# Patient Record
Sex: MALE | Race: OTHER | Employment: FULL TIME | URBAN - METROPOLITAN AREA
[De-identification: names, ages, dates, MRNs, and addresses within clinical notes are randomized per-mention and may not be internally consistent; named-entity substitution may affect disease eponyms.]

---

## 2017-07-01 ENCOUNTER — HOSPITAL ENCOUNTER (OUTPATIENT)
Age: 32
Discharge: HOME | End: 2017-07-01
Payer: COMMERCIAL

## 2017-07-01 VITALS
RESPIRATION RATE: 16 BRPM | BODY MASS INDEX: 31.07 KG/M2 | SYSTOLIC BLOOD PRESSURE: 135 MMHG | DIASTOLIC BLOOD PRESSURE: 83 MMHG | HEART RATE: 81 BPM | WEIGHT: 205 LBS | OXYGEN SATURATION: 98 % | TEMPERATURE: 99 F | HEIGHT: 68 IN
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
## 2017-07-01 PROCEDURE — 99202 OFFICE O/P NEW SF 15 MIN: CPT

## 2017-07-01 PROCEDURE — 99203 OFFICE O/P NEW LOW 30 MIN: CPT

## 2017-07-01 RX ORDER — ALBUTEROL SULFATE 90 UG/1
AEROSOL, METERED RESPIRATORY (INHALATION) EVERY 6 HOURS PRN
COMMUNITY
End: 2018-01-03
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
## 2018-01-03 ENCOUNTER — OFFICE VISIT (OUTPATIENT)
Dept: INTERNAL MEDICINE CLINIC | Facility: CLINIC | Age: 33
End: 2018-01-03

## 2018-01-03 VITALS
WEIGHT: 214 LBS | HEART RATE: 78 BPM | HEIGHT: 68 IN | TEMPERATURE: 99 F | BODY MASS INDEX: 32.43 KG/M2 | SYSTOLIC BLOOD PRESSURE: 120 MMHG | DIASTOLIC BLOOD PRESSURE: 84 MMHG | OXYGEN SATURATION: 97 %

## 2018-01-03 DIAGNOSIS — Z00.00 ANNUAL PHYSICAL EXAM: Primary | ICD-10-CM

## 2018-01-03 DIAGNOSIS — K51.919 ULCERATIVE COLITIS WITH COMPLICATION, UNSPECIFIED LOCATION (HCC): ICD-10-CM

## 2018-01-03 DIAGNOSIS — J45.40 MODERATE PERSISTENT ASTHMA WITHOUT COMPLICATION: ICD-10-CM

## 2018-01-03 PROCEDURE — 99385 PREV VISIT NEW AGE 18-39: CPT | Performed by: INTERNAL MEDICINE

## 2018-01-03 RX ORDER — ALBUTEROL SULFATE 90 UG/1
1 AEROSOL, METERED RESPIRATORY (INHALATION) EVERY 6 HOURS PRN
Qty: 1 INHALER | Refills: 5 | Status: SHIPPED | OUTPATIENT
Start: 2018-01-03 | End: 2019-02-02

## 2018-01-03 RX ORDER — FLUTICASONE PROPIONATE AND SALMETEROL 100; 50 UG/1; UG/1
POWDER RESPIRATORY (INHALATION)
COMMUNITY
Start: 2016-06-15 | End: 2018-01-03

## 2018-01-03 RX ORDER — FLUTICASONE PROPIONATE AND SALMETEROL 100; 50 UG/1; UG/1
1 POWDER RESPIRATORY (INHALATION) 2 TIMES DAILY
Qty: 3 EACH | Refills: 4 | Status: SHIPPED | OUTPATIENT
Start: 2018-01-03 | End: 2020-03-16 | Stop reason: ALTCHOICE

## 2018-01-03 RX ORDER — ALBUTEROL SULFATE 90 UG/1
2 AEROSOL, METERED RESPIRATORY (INHALATION)
COMMUNITY
Start: 2017-10-23 | End: 2018-01-03

## 2018-01-03 RX ORDER — FLUTICASONE PROPIONATE AND SALMETEROL 100; 50 UG/1; UG/1
1 POWDER RESPIRATORY (INHALATION) 2 TIMES DAILY
COMMUNITY
End: 2018-01-03

## 2018-01-03 RX ORDER — MESALAMINE 1.2 G/1
TABLET, DELAYED RELEASE ORAL
COMMUNITY
Start: 2014-12-19 | End: 2020-03-16 | Stop reason: ALTCHOICE

## 2018-01-03 NOTE — PROGRESS NOTES
Anny Rogel is a 28year old male.     Chief complaint:  Annual physical exam   HPI:     28year old male with PMH as listed below here for   Annual physical exam   Patient reports feeling well   Hx of  UC diagnosed 2009   Last colonoscopy 2016  No abdomi murmur  GI: good BS's,no masses, HSM or tenderness  EXTREMITIES: no cyanosis, clubbing or edema  NEURO: no gross deficits                Orders Placed This Encounter      DISCONTD: fluticasone-salmeterol 100-50 MCG/DOSE Inhalation Aerosol Powder, Breath Ac

## 2018-01-10 ENCOUNTER — NURSE ONLY (OUTPATIENT)
Dept: INTERNAL MEDICINE CLINIC | Facility: CLINIC | Age: 33
End: 2018-01-10

## 2018-01-10 DIAGNOSIS — K51.919 ULCERATIVE COLITIS WITH COMPLICATION, UNSPECIFIED LOCATION (HCC): ICD-10-CM

## 2018-01-10 LAB
ALBUMIN SERPL BCP-MCNC: 4.2 G/DL (ref 3.5–4.8)
ALBUMIN/GLOB SERPL: 1.2 {RATIO} (ref 1–2)
ALP SERPL-CCNC: 69 U/L (ref 32–100)
ALT SERPL-CCNC: 25 U/L (ref 17–63)
ANION GAP SERPL CALC-SCNC: 7 MMOL/L (ref 0–18)
AST SERPL-CCNC: 25 U/L (ref 15–41)
BASOPHILS # BLD: 0 K/UL (ref 0–0.2)
BASOPHILS NFR BLD: 0 %
BILIRUB SERPL-MCNC: 0.8 MG/DL (ref 0.3–1.2)
BUN SERPL-MCNC: 10 MG/DL (ref 8–20)
BUN/CREAT SERPL: 10.2 (ref 10–20)
CALCIUM SERPL-MCNC: 9.3 MG/DL (ref 8.5–10.5)
CHLORIDE SERPL-SCNC: 105 MMOL/L (ref 95–110)
CHOLEST SERPL-MCNC: 171 MG/DL (ref 110–200)
CO2 SERPL-SCNC: 27 MMOL/L (ref 22–32)
CREAT SERPL-MCNC: 0.98 MG/DL (ref 0.5–1.5)
EOSINOPHIL # BLD: 0.3 K/UL (ref 0–0.7)
EOSINOPHIL NFR BLD: 4 %
ERYTHROCYTE [DISTWIDTH] IN BLOOD BY AUTOMATED COUNT: 13.8 % (ref 11–15)
FERRITIN SERPL IA-MCNC: 27 NG/ML (ref 24–336)
FOLATE SERPL-MCNC: 14.8 NG/ML
GLOBULIN PLAS-MCNC: 3.5 G/DL (ref 2.5–3.7)
GLUCOSE SERPL-MCNC: 88 MG/DL (ref 70–99)
HCT VFR BLD AUTO: 48.6 % (ref 41–52)
HDLC SERPL-MCNC: 52 MG/DL
HGB BLD-MCNC: 16.7 G/DL (ref 13.5–17.5)
LDLC SERPL CALC-MCNC: 107 MG/DL (ref 0–99)
LYMPHOCYTES # BLD: 1.8 K/UL (ref 1–4)
LYMPHOCYTES NFR BLD: 24 %
MCH RBC QN AUTO: 29.2 PG (ref 27–32)
MCHC RBC AUTO-ENTMCNC: 34.3 G/DL (ref 32–37)
MCV RBC AUTO: 85 FL (ref 80–100)
MONOCYTES # BLD: 0.5 K/UL (ref 0–1)
MONOCYTES NFR BLD: 7 %
NEUTROPHILS # BLD AUTO: 5 K/UL (ref 1.8–7.7)
NEUTROPHILS NFR BLD: 66 %
NONHDLC SERPL-MCNC: 119 MG/DL
OSMOLALITY UR CALC.SUM OF ELEC: 286 MOSM/KG (ref 275–295)
PLATELET # BLD AUTO: 260 K/UL (ref 140–400)
PMV BLD AUTO: 9.1 FL (ref 7.4–10.3)
POTASSIUM SERPL-SCNC: 4.1 MMOL/L (ref 3.3–5.1)
PROT SERPL-MCNC: 7.7 G/DL (ref 5.9–8.4)
RBC # BLD AUTO: 5.72 M/UL (ref 4.5–5.9)
SODIUM SERPL-SCNC: 139 MMOL/L (ref 136–144)
TRIGL SERPL-MCNC: 62 MG/DL (ref 1–149)
VIT B12 SERPL-MCNC: 335 PG/ML (ref 181–914)
WBC # BLD AUTO: 7.7 K/UL (ref 4–11)

## 2018-01-10 PROCEDURE — 82607 VITAMIN B-12: CPT | Performed by: INTERNAL MEDICINE

## 2018-01-10 PROCEDURE — 80061 LIPID PANEL: CPT | Performed by: INTERNAL MEDICINE

## 2018-01-10 PROCEDURE — 80053 COMPREHEN METABOLIC PANEL: CPT | Performed by: INTERNAL MEDICINE

## 2018-01-10 PROCEDURE — 82306 VITAMIN D 25 HYDROXY: CPT | Performed by: INTERNAL MEDICINE

## 2018-01-10 PROCEDURE — 85025 COMPLETE CBC W/AUTO DIFF WBC: CPT | Performed by: INTERNAL MEDICINE

## 2018-01-10 PROCEDURE — 36415 COLL VENOUS BLD VENIPUNCTURE: CPT | Performed by: INTERNAL MEDICINE

## 2018-01-10 PROCEDURE — 82746 ASSAY OF FOLIC ACID SERUM: CPT | Performed by: INTERNAL MEDICINE

## 2018-01-10 PROCEDURE — 82728 ASSAY OF FERRITIN: CPT | Performed by: INTERNAL MEDICINE

## 2018-01-12 LAB — 25(OH)D3 SERPL-MCNC: 10.5 NG/ML

## 2018-01-14 ENCOUNTER — TELEPHONE (OUTPATIENT)
Dept: INTERNAL MEDICINE CLINIC | Facility: CLINIC | Age: 33
End: 2018-01-14

## 2018-01-14 RX ORDER — ERGOCALCIFEROL 1.25 MG/1
50000 CAPSULE ORAL WEEKLY
Qty: 12 CAPSULE | Refills: 1 | Status: SHIPPED | OUTPATIENT
Start: 2018-01-14 | End: 2018-07-18

## 2018-01-17 ENCOUNTER — TELEPHONE (OUTPATIENT)
Dept: INTERNAL MEDICINE CLINIC | Facility: CLINIC | Age: 33
End: 2018-01-17

## 2018-01-17 NOTE — TELEPHONE ENCOUNTER
Faxed # [phone] a medical records form to [id number] for records on pt named above

## 2018-07-18 RX ORDER — ERGOCALCIFEROL 1.25 MG/1
CAPSULE ORAL
Qty: 4 CAPSULE | Refills: 0 | Status: SHIPPED | OUTPATIENT
Start: 2018-07-18 | End: 2020-03-16 | Stop reason: ALTCHOICE

## 2019-02-02 DIAGNOSIS — K51.919 ULCERATIVE COLITIS WITH COMPLICATION, UNSPECIFIED LOCATION (HCC): ICD-10-CM

## 2019-02-04 NOTE — TELEPHONE ENCOUNTER
Requested Prescriptions     Pending Prescriptions Disp Refills   • PROAIR  (90 Base) MCG/ACT Inhalation Aero Soln [Pharmacy Med Name: [facility] Inhalation Aerosol Solution 108 (90 Base) MCG/ACT] 8.5 g 4     Sig: inhale 1 puff into the lungs every 6

## 2019-06-11 ENCOUNTER — LAB ENCOUNTER (OUTPATIENT)
Dept: LAB | Age: 34
End: 2019-06-11
Attending: OBSTETRICS & GYNECOLOGY
Payer: COMMERCIAL

## 2019-06-11 DIAGNOSIS — Z01.83 ENCOUNTER FOR BLOOD TYPING: Primary | ICD-10-CM

## 2019-06-11 PROCEDURE — 86900 BLOOD TYPING SEROLOGIC ABO: CPT

## 2019-06-11 PROCEDURE — 36415 COLL VENOUS BLD VENIPUNCTURE: CPT

## 2019-06-11 PROCEDURE — 86850 RBC ANTIBODY SCREEN: CPT

## 2019-06-11 PROCEDURE — 86901 BLOOD TYPING SEROLOGIC RH(D): CPT

## 2020-02-10 DIAGNOSIS — K51.919 ULCERATIVE COLITIS WITH COMPLICATION, UNSPECIFIED LOCATION (HCC): ICD-10-CM

## 2020-02-10 RX ORDER — ALBUTEROL SULFATE 90 UG/1
AEROSOL, METERED RESPIRATORY (INHALATION)
Qty: 8.5 G | Refills: 0 | OUTPATIENT
Start: 2020-02-10

## 2020-02-17 DIAGNOSIS — K51.919 ULCERATIVE COLITIS WITH COMPLICATION, UNSPECIFIED LOCATION (HCC): ICD-10-CM

## 2020-02-17 RX ORDER — ALBUTEROL SULFATE 90 UG/1
AEROSOL, METERED RESPIRATORY (INHALATION)
Qty: 8.5 G | Refills: 0 | OUTPATIENT
Start: 2020-02-17

## 2020-02-24 DIAGNOSIS — K51.919 ULCERATIVE COLITIS WITH COMPLICATION, UNSPECIFIED LOCATION (HCC): ICD-10-CM

## 2020-02-24 RX ORDER — ALBUTEROL SULFATE 90 UG/1
AEROSOL, METERED RESPIRATORY (INHALATION)
Qty: 8.5 G | Refills: 4 | OUTPATIENT
Start: 2020-02-24

## 2020-02-27 DIAGNOSIS — K51.919 ULCERATIVE COLITIS WITH COMPLICATION, UNSPECIFIED LOCATION (HCC): ICD-10-CM

## 2020-02-28 RX ORDER — ALBUTEROL SULFATE 90 UG/1
2 AEROSOL, METERED RESPIRATORY (INHALATION) EVERY 6 HOURS PRN
Qty: 8.5 G | Refills: 4 | Status: SHIPPED | OUTPATIENT
Start: 2020-02-28 | End: 2020-05-04

## 2020-03-16 ENCOUNTER — OFFICE VISIT (OUTPATIENT)
Dept: INTERNAL MEDICINE CLINIC | Facility: CLINIC | Age: 35
End: 2020-03-16
Payer: COMMERCIAL

## 2020-03-16 VITALS
RESPIRATION RATE: 21 BRPM | BODY MASS INDEX: 33.65 KG/M2 | WEIGHT: 222 LBS | OXYGEN SATURATION: 99 % | DIASTOLIC BLOOD PRESSURE: 80 MMHG | HEIGHT: 68 IN | SYSTOLIC BLOOD PRESSURE: 126 MMHG | TEMPERATURE: 98 F | HEART RATE: 92 BPM

## 2020-03-16 DIAGNOSIS — K51.919 ULCERATIVE COLITIS WITH COMPLICATION, UNSPECIFIED LOCATION (HCC): ICD-10-CM

## 2020-03-16 DIAGNOSIS — J45.20 MILD INTERMITTENT ASTHMA, UNSPECIFIED WHETHER COMPLICATED: ICD-10-CM

## 2020-03-16 DIAGNOSIS — Z00.00 ANNUAL PHYSICAL EXAM: Primary | ICD-10-CM

## 2020-03-16 PROCEDURE — 99395 PREV VISIT EST AGE 18-39: CPT | Performed by: INTERNAL MEDICINE

## 2020-03-16 RX ORDER — FLUTICASONE PROPIONATE AND SALMETEROL 100; 50 UG/1; UG/1
1 POWDER RESPIRATORY (INHALATION) 2 TIMES DAILY
Qty: 14 EACH | Refills: 1 | Status: SHIPPED | OUTPATIENT
Start: 2020-03-16

## 2020-03-16 NOTE — PROGRESS NOTES
Eloina Brooks is a 29year old male.     Chief complaint: Annual physical exam     HPI:   29year old male with PMH as listed below here for annual physical exam   Asthma   Albuterol inhaler 2-3 times per week   Not using advair  No night symptoms   No ches clear  NECK: supple,no adenopathy  LUNGS: clear to auscultation  CARDIO: RRR without murmur  GI: good BS's,no masses, HSM or tenderness  EXTREMITIES: no cyanosis, clubbing or edema  NEURO: no gross deficits              No orders of the defined types were

## 2020-05-04 DIAGNOSIS — K51.919 ULCERATIVE COLITIS WITH COMPLICATION, UNSPECIFIED LOCATION (HCC): ICD-10-CM

## 2020-05-04 RX ORDER — ALBUTEROL SULFATE 90 UG/1
2 AEROSOL, METERED RESPIRATORY (INHALATION) EVERY 6 HOURS PRN
Qty: 8.5 G | Refills: 4 | Status: SHIPPED | OUTPATIENT
Start: 2020-05-04 | End: 2021-03-08

## 2020-05-04 NOTE — TELEPHONE ENCOUNTER
Asthma & COPD Medication Protocol Failed5/4 2:16 PM   Asthma Action Score greater than or equal to 20    AAP/ACT given in last 12 months    Appointment in past 6 or next 3 months      Last OV 3/16/20   No Future appt     Patient requesting estuardo pharmac

## 2021-03-08 DIAGNOSIS — K51.919 ULCERATIVE COLITIS WITH COMPLICATION, UNSPECIFIED LOCATION (HCC): ICD-10-CM

## 2021-03-08 RX ORDER — ALBUTEROL SULFATE 90 UG/1
AEROSOL, METERED RESPIRATORY (INHALATION)
Qty: 17 G | Refills: 0 | Status: SHIPPED | OUTPATIENT
Start: 2021-03-08 | End: 2021-10-12

## 2021-03-08 RX ORDER — ALBUTEROL SULFATE 90 UG/1
AEROSOL, METERED RESPIRATORY (INHALATION)
Qty: 17 G | Refills: 0 | OUTPATIENT
Start: 2021-03-08

## 2021-04-12 ENCOUNTER — OFFICE VISIT (OUTPATIENT)
Dept: INTERNAL MEDICINE CLINIC | Facility: CLINIC | Age: 36
End: 2021-04-12
Payer: COMMERCIAL

## 2021-04-12 VITALS
SYSTOLIC BLOOD PRESSURE: 120 MMHG | DIASTOLIC BLOOD PRESSURE: 80 MMHG | OXYGEN SATURATION: 98 % | WEIGHT: 230 LBS | BODY MASS INDEX: 34.86 KG/M2 | HEIGHT: 68 IN | HEART RATE: 87 BPM

## 2021-04-12 DIAGNOSIS — K51.919 ULCERATIVE COLITIS WITH COMPLICATION, UNSPECIFIED LOCATION (HCC): ICD-10-CM

## 2021-04-12 DIAGNOSIS — F41.9 ANXIETY AND DEPRESSION: ICD-10-CM

## 2021-04-12 DIAGNOSIS — J45.20 MILD INTERMITTENT ASTHMA, UNSPECIFIED WHETHER COMPLICATED: ICD-10-CM

## 2021-04-12 DIAGNOSIS — M25.569 KNEE PAIN, UNSPECIFIED CHRONICITY, UNSPECIFIED LATERALITY: ICD-10-CM

## 2021-04-12 DIAGNOSIS — F32.A ANXIETY AND DEPRESSION: ICD-10-CM

## 2021-04-12 DIAGNOSIS — Z00.00 ANNUAL PHYSICAL EXAM: Primary | ICD-10-CM

## 2021-04-12 DIAGNOSIS — K52.9 IBD (INFLAMMATORY BOWEL DISEASE): ICD-10-CM

## 2021-04-12 PROCEDURE — 99395 PREV VISIT EST AGE 18-39: CPT | Performed by: INTERNAL MEDICINE

## 2021-04-12 PROCEDURE — 3008F BODY MASS INDEX DOCD: CPT | Performed by: INTERNAL MEDICINE

## 2021-04-12 PROCEDURE — 3074F SYST BP LT 130 MM HG: CPT | Performed by: INTERNAL MEDICINE

## 2021-04-12 PROCEDURE — 3079F DIAST BP 80-89 MM HG: CPT | Performed by: INTERNAL MEDICINE

## 2021-04-12 NOTE — PROGRESS NOTES
Nj Balderas is a 28year old male.     Chief complaint:annual physical exam       HPI:     Nj Balderas is a 28year old male who presents for annual physical exam       No chest pain   No sob   No palpitations   No urinary problems         Hx of IBD   U repeat in 3 years    • ELBOW SURGERY          Social History:  Social History    Tobacco Use      Smoking status: Never Smoker      Smokeless tobacco: Never Used    Alcohol use: Yes    Drug use: Not on file       Family History   Problem Relation Age of On Future    2. Annual physical exam  Diet and  exercise  Fasting blood work    - CBC WITH DIFFERENTIAL WITH PLATELET; Future  - COMP METABOLIC PANEL (14); Future  - LIPID PANEL;  Future  - VITAMIN D, 25-HYDROXY; Future  - TSH W REFLEX TO FREE T4; Future  - SE Future  - SED RATE, SMITHA (AUTOMATED); Future  - C-REACTIVE PROTEIN; Future  - GASTRO - INTERNAL  - Sertraline HCl 50 MG Oral Tab; Take 1 tablet (50 mg total) by mouth daily. Dispense: 30 tablet;  Refill: 1  - BH NAVIGATOR  - XR KNEE COMPLETE BILAT EM

## 2021-04-12 NOTE — PATIENT INSTRUCTIONS
Controlling High Blood Pressure  High blood pressure (hypertension) is often called the silent killer. This is because many people who have it, don’t know it. It can be very dangerous.  High blood pressure can raise your risk of heart attack, stroke, hear your meal be prepared with no added salt. Stay at a healthy weight  · Ask your healthcare provider how many calories to eat a day. Then stick to that number. · Ask your healthcare provider what weight range is healthiest for you.  If you are overweight, information is not intended as a substitute for professional medical care. Always follow your healthcare professional's instructions. Text SUPPORT1 uu 93969 to learn if you may be eligible for financial support with your medication(s).     Msg & Data contact with reality  · loss of balance or coordination  · loss of memory  · painful or prolonged erections  · restlessness, pacing, inability to keep still  · seizures  · stiff muscles  · suicidal thoughts or other mood changes  · trouble sleeping  · unus · tolbutamide  · tramadol  · tryptophan  What if I miss a dose? If you miss a dose, take it as soon as you can. If it is almost time for your next dose, take only that dose. Do not take double or extra doses. Where should I keep my medicine?   Keep out of change in dose, call your health care professional.  Rakan Chao may get drowsy or dizzy. Do not drive, use machinery, or do anything that needs mental alertness until you know how this medicine affects you.  Do not stand or sit up quickly, especially if you are an

## 2021-05-10 PROCEDURE — 86140 C-REACTIVE PROTEIN: CPT | Performed by: INTERNAL MEDICINE

## 2021-05-10 PROCEDURE — 82306 VITAMIN D 25 HYDROXY: CPT | Performed by: INTERNAL MEDICINE

## 2021-05-10 PROCEDURE — 80050 GENERAL HEALTH PANEL: CPT | Performed by: INTERNAL MEDICINE

## 2021-05-10 PROCEDURE — 85652 RBC SED RATE AUTOMATED: CPT | Performed by: INTERNAL MEDICINE

## 2021-05-10 PROCEDURE — 80061 LIPID PANEL: CPT | Performed by: INTERNAL MEDICINE

## 2021-05-10 NOTE — PROGRESS NOTES
Karen Balderas is a 28year old male.     Chief complaint: follow up on medication     HPI:     Karen Balderas is a 28year old pleasant male who presents for follow up on medication     Started sertraline   Feeling calmer   Less anxious   No side effects   N ASSESSMENT AND PLAN:     1. Mild intermittent asthma, unspecified whether complicated  Inhaler   Pneumovax 23   - Sertraline HCl 50 MG Oral Tab; Take 1 tablet (50 mg total) by mouth daily. Dispense: 90 tablet;  Refill: 1  - PNEUMOCOCCAL IMM (PNEUMO tablet; Refill: 1  - PNEUMOCOCCAL IMM (PNEUMOVAX)  - CBC WITH DIFFERENTIAL WITH PLATELET  - COMP METABOLIC PANEL (14)  - LIPID PANEL  - VITAMIN D, 25-HYDROXY  - TSH W REFLEX TO FREE T4  - SED RATE, WESTERGREN (AUTOMATED)  - C-REACTIVE PROTEIN    6.  Knee pa

## 2021-05-16 RX ORDER — ERGOCALCIFEROL 1.25 MG/1
50000 CAPSULE ORAL WEEKLY
Qty: 12 CAPSULE | Refills: 1 | Status: SHIPPED | OUTPATIENT
Start: 2021-05-16 | End: 2021-08-02

## 2021-06-18 DIAGNOSIS — M25.569 KNEE PAIN, UNSPECIFIED CHRONICITY, UNSPECIFIED LATERALITY: ICD-10-CM

## 2021-06-18 DIAGNOSIS — J45.20 MILD INTERMITTENT ASTHMA, UNSPECIFIED WHETHER COMPLICATED: ICD-10-CM

## 2021-06-18 DIAGNOSIS — F41.9 ANXIETY AND DEPRESSION: ICD-10-CM

## 2021-06-18 DIAGNOSIS — K51.919 ULCERATIVE COLITIS WITH COMPLICATION, UNSPECIFIED LOCATION (HCC): ICD-10-CM

## 2021-06-18 DIAGNOSIS — K52.9 IBD (INFLAMMATORY BOWEL DISEASE): ICD-10-CM

## 2021-06-18 DIAGNOSIS — Z23 NEED FOR VACCINATION: ICD-10-CM

## 2021-06-18 DIAGNOSIS — F32.A ANXIETY AND DEPRESSION: ICD-10-CM

## 2021-10-12 DIAGNOSIS — K51.919 ULCERATIVE COLITIS WITH COMPLICATION, UNSPECIFIED LOCATION (HCC): ICD-10-CM

## 2021-10-12 RX ORDER — ALBUTEROL SULFATE 90 UG/1
2 AEROSOL, METERED RESPIRATORY (INHALATION) EVERY 6 HOURS PRN
Qty: 17 G | Refills: 0 | Status: SHIPPED | OUTPATIENT
Start: 2021-10-12

## 2021-10-12 RX ORDER — ERGOCALCIFEROL 1.25 MG/1
CAPSULE ORAL
COMMUNITY
Start: 2021-08-13

## 2021-11-15 ENCOUNTER — OFFICE VISIT (OUTPATIENT)
Dept: INTERNAL MEDICINE CLINIC | Facility: CLINIC | Age: 36
End: 2021-11-15
Payer: COMMERCIAL

## 2021-11-15 VITALS
WEIGHT: 232 LBS | HEIGHT: 68 IN | HEART RATE: 70 BPM | BODY MASS INDEX: 35.16 KG/M2 | OXYGEN SATURATION: 100 % | SYSTOLIC BLOOD PRESSURE: 132 MMHG | DIASTOLIC BLOOD PRESSURE: 82 MMHG

## 2021-11-15 DIAGNOSIS — Z23 NEED FOR VACCINATION: ICD-10-CM

## 2021-11-15 DIAGNOSIS — F41.9 ANXIETY AND DEPRESSION: ICD-10-CM

## 2021-11-15 DIAGNOSIS — R68.84 JAW PAIN: ICD-10-CM

## 2021-11-15 DIAGNOSIS — E55.9 VITAMIN D DEFICIENCY: ICD-10-CM

## 2021-11-15 DIAGNOSIS — F32.A ANXIETY AND DEPRESSION: ICD-10-CM

## 2021-11-15 DIAGNOSIS — M26.609 TMJ (TEMPOROMANDIBULAR JOINT DISORDER): Primary | ICD-10-CM

## 2021-11-15 PROCEDURE — 3008F BODY MASS INDEX DOCD: CPT | Performed by: INTERNAL MEDICINE

## 2021-11-15 PROCEDURE — 90471 IMMUNIZATION ADMIN: CPT | Performed by: INTERNAL MEDICINE

## 2021-11-15 PROCEDURE — 3079F DIAST BP 80-89 MM HG: CPT | Performed by: INTERNAL MEDICINE

## 2021-11-15 PROCEDURE — 99214 OFFICE O/P EST MOD 30 MIN: CPT | Performed by: INTERNAL MEDICINE

## 2021-11-15 PROCEDURE — 90686 IIV4 VACC NO PRSV 0.5 ML IM: CPT | Performed by: INTERNAL MEDICINE

## 2021-11-15 PROCEDURE — 3075F SYST BP GE 130 - 139MM HG: CPT | Performed by: INTERNAL MEDICINE

## 2021-11-15 RX ORDER — CYCLOBENZAPRINE HCL 10 MG
10 TABLET ORAL NIGHTLY
Qty: 15 TABLET | Refills: 0 | Status: SHIPPED | OUTPATIENT
Start: 2021-11-15 | End: 2021-11-30

## 2021-11-15 RX ORDER — MELOXICAM 15 MG/1
15 TABLET ORAL DAILY
Qty: 10 TABLET | Refills: 0 | Status: SHIPPED | OUTPATIENT
Start: 2021-11-15 | End: 2021-11-25

## 2021-11-15 NOTE — PROGRESS NOTES
Miranda Gonsales is a 39year old male.     Chief complaint: TMJ   HPI:     Miranda Gonsales is a 39year old pleasant male who presents for     Jaw pain   Was diagnosed with  TMJ   Right side   Was seeing a dentist   Had cone beam scan   Signed up to go to a spe atraumatic, normocephalic,ears and throat are clear  Jaw with tenderness with certain movement on the right side     NECK: supple,no adenopathy    NEURO: no gross deficits              No orders of the defined types were placed in this encounter.     No res

## 2021-11-15 NOTE — PATIENT INSTRUCTIONS
Dental Treatment for Temporomandibular Disorders (TMD)  You have been diagnosed with temporomandibular disorder (TMD). This term describes a group of problems linked to the temporomandibular joint (TMJ) and nearby muscles.  The TMJ is located where the up jaw function. If your jaws or teeth are out of alignment, orthodontic treatment may help. If your bad bite is due to missing or damaged teeth, you may receive restorative treatment.    · Restorative treatment. A bad bite can be caused by missing or damaged available over the counter. The medicine type and dosage will depend on the problem you have. For your safety, tell your healthcare provider if you are currently taking any medicines, vitamins, herbs, or supplements.  Common medicines used to treat TMD incl program may include exercises. It may also include using ice and heat to ease pain. · Gentle manipulation. This reduces pain and restores range of motion. The healthcare provider uses their hands to relax muscles and ligaments around the joint.   · Exercis

## 2021-11-16 PROBLEM — M26.609 TMJ (TEMPOROMANDIBULAR JOINT DISORDER): Status: ACTIVE | Noted: 2021-11-16

## 2021-11-16 PROBLEM — F32.A ANXIETY AND DEPRESSION: Status: ACTIVE | Noted: 2021-11-16

## 2021-11-16 PROBLEM — F41.9 ANXIETY AND DEPRESSION: Status: ACTIVE | Noted: 2021-11-16

## 2021-11-16 PROBLEM — E55.9 VITAMIN D DEFICIENCY: Status: ACTIVE | Noted: 2021-11-16

## 2021-12-10 ENCOUNTER — PATIENT MESSAGE (OUTPATIENT)
Dept: INTERNAL MEDICINE CLINIC | Facility: CLINIC | Age: 36
End: 2021-12-10

## 2021-12-13 RX ORDER — SERTRALINE HYDROCHLORIDE 100 MG/1
100 TABLET, FILM COATED ORAL DAILY
Qty: 90 TABLET | Refills: 1 | Status: SHIPPED | OUTPATIENT
Start: 2021-12-13 | End: 2022-03-13

## 2021-12-13 NOTE — TELEPHONE ENCOUNTER
Samanta Kingsley, [facility] 12/11/2021 7:47 AM CST      ----- Message -----  From: Gerald James  Sent: 12/10/2021 2:06 PM CST  To: Reyna Cordova Clinical Staff  Subject: Medication Refill and Adjustment Request     Hi Dr. Leslee Webb,  I recently switched from taking 50ml t

## 2022-01-17 ENCOUNTER — TELEMEDICINE (OUTPATIENT)
Dept: INTERNAL MEDICINE CLINIC | Facility: CLINIC | Age: 37
End: 2022-01-17

## 2022-01-17 DIAGNOSIS — F41.9 ANXIETY AND DEPRESSION: ICD-10-CM

## 2022-01-17 DIAGNOSIS — F32.A ANXIETY AND DEPRESSION: ICD-10-CM

## 2022-01-17 DIAGNOSIS — S03.40XD SPRAIN OF TEMPOROMANDIBULAR JOINT, SUBSEQUENT ENCOUNTER: Primary | ICD-10-CM

## 2022-01-17 PROCEDURE — 99214 OFFICE O/P EST MOD 30 MIN: CPT | Performed by: INTERNAL MEDICINE

## 2022-01-17 RX ORDER — SERTRALINE HYDROCHLORIDE 100 MG/1
100 TABLET, FILM COATED ORAL DAILY
Qty: 90 TABLET | Refills: 1 | Status: SHIPPED | OUTPATIENT
Start: 2022-01-17 | End: 2022-04-17

## 2022-01-17 RX ORDER — CYCLOBENZAPRINE HCL 10 MG
10 TABLET ORAL 2 TIMES DAILY PRN
Qty: 20 TABLET | Refills: 1 | Status: SHIPPED | OUTPATIENT
Start: 2022-01-17

## 2022-01-17 NOTE — PROGRESS NOTES
This visit was conducted using telemedicine with live interactive video and audio   Patient verbally consents to conduct video visit   Patient understands and accepts financial responsibility for any deductible, co-insurance and/or co-pays associated with puff into the lungs 2 (two) times daily.  14 each 1     Patient Active Problem List:     Annual physical exam     Mild intermittent asthma     Ulcerative colitis ([facility] [address]     Acute stress disorder     Asthma     Anxiety and depression     TMJ (temporomandibula been done in good micky to provide continuity of care in the best interest of the provider-patient relationship, due to the ongoing public health crises/ national emergency  due to [place] 19 and because of restrictions of visitation.  Every conscious effort

## 2022-01-31 ENCOUNTER — OFFICE VISIT (OUTPATIENT)
Dept: OTOLARYNGOLOGY | Facility: CLINIC | Age: 37
End: 2022-01-31
Payer: COMMERCIAL

## 2022-01-31 VITALS — BODY MASS INDEX: 35.16 KG/M2 | WEIGHT: 232 LBS | HEIGHT: 68 IN

## 2022-01-31 DIAGNOSIS — R68.84 JAW PAIN: Primary | ICD-10-CM

## 2022-01-31 PROCEDURE — 99243 OFF/OP CNSLTJ NEW/EST LOW 30: CPT | Performed by: OTOLARYNGOLOGY

## 2022-01-31 PROCEDURE — 3008F BODY MASS INDEX DOCD: CPT | Performed by: OTOLARYNGOLOGY

## 2022-01-31 RX ORDER — CELECOXIB 200 MG/1
200 CAPSULE ORAL DAILY PRN
Qty: 30 CAPSULE | Refills: 0 | Status: SHIPPED | OUTPATIENT
Start: 2022-01-31 | End: 2022-03-02

## 2022-02-01 NOTE — PROGRESS NOTES
Pretty Love is a 39year old male. Patient presents with:  Tmj Disorder: pt referred by PCP for TMJ having right jaw pain had to massage it to relieve the pain.  prescribed muscle relaxants and it has gotten better hurts when eating and long periods of t Negative Drooling. Eyes Negative Blurred vision and vision changes. Respiratory Negative Dyspnea and wheezing. Cardio Negative Chest pain, irregular heartbeat/palpitations and syncope. GI Negative Abdominal pain and diarrhea.    Endocrine Negative C daily., Disp: 90 tablet, Rfl: 1  •  cyclobenzaprine 10 MG Oral Tab, Take 1 tablet (10 mg total) by mouth 2 (two) times daily as needed for Muscle spasms. , Disp: 20 tablet, Rfl: 1  •  sertraline 100 MG Oral Tab, Take 1 tablet (100 mg total) by mouth daily. ,

## 2022-02-07 ENCOUNTER — TELEPHONE (OUTPATIENT)
Dept: PHYSICAL THERAPY | Facility: HOSPITAL | Age: 37
End: 2022-02-07

## 2022-02-28 ENCOUNTER — OFFICE VISIT (OUTPATIENT)
Dept: OTOLARYNGOLOGY | Facility: CLINIC | Age: 37
End: 2022-02-28
Payer: COMMERCIAL

## 2022-02-28 DIAGNOSIS — R68.84 JAW PAIN: Primary | ICD-10-CM

## 2022-02-28 PROCEDURE — 99213 OFFICE O/P EST LOW 20 MIN: CPT | Performed by: OTOLARYNGOLOGY

## 2022-03-03 ENCOUNTER — TELEPHONE (OUTPATIENT)
Dept: PHYSICAL THERAPY | Facility: HOSPITAL | Age: 37
End: 2022-03-03

## 2022-03-08 ENCOUNTER — OFFICE VISIT (OUTPATIENT)
Dept: PHYSICAL THERAPY | Age: 37
End: 2022-03-08
Attending: OTOLARYNGOLOGY
Payer: COMMERCIAL

## 2022-03-08 DIAGNOSIS — R68.84 JAW PAIN: ICD-10-CM

## 2022-03-08 PROCEDURE — 97161 PT EVAL LOW COMPLEX 20 MIN: CPT

## 2022-03-08 PROCEDURE — 97110 THERAPEUTIC EXERCISES: CPT

## 2022-03-10 ENCOUNTER — OFFICE VISIT (OUTPATIENT)
Dept: PHYSICAL THERAPY | Age: 37
End: 2022-03-10
Attending: OTOLARYNGOLOGY
Payer: COMMERCIAL

## 2022-03-10 PROCEDURE — 97140 MANUAL THERAPY 1/> REGIONS: CPT

## 2022-03-10 PROCEDURE — 97110 THERAPEUTIC EXERCISES: CPT

## 2022-03-14 ENCOUNTER — APPOINTMENT (OUTPATIENT)
Dept: PHYSICAL THERAPY | Age: 37
End: 2022-03-14
Attending: OTOLARYNGOLOGY
Payer: COMMERCIAL

## 2022-03-16 ENCOUNTER — OFFICE VISIT (OUTPATIENT)
Dept: PHYSICAL THERAPY | Age: 37
End: 2022-03-16
Attending: OTOLARYNGOLOGY
Payer: COMMERCIAL

## 2022-03-16 PROCEDURE — 97140 MANUAL THERAPY 1/> REGIONS: CPT

## 2022-03-16 PROCEDURE — 97110 THERAPEUTIC EXERCISES: CPT

## 2022-03-21 ENCOUNTER — OFFICE VISIT (OUTPATIENT)
Dept: PHYSICAL THERAPY | Age: 37
End: 2022-03-21
Attending: OTOLARYNGOLOGY
Payer: COMMERCIAL

## 2022-03-21 ENCOUNTER — TELEPHONE (OUTPATIENT)
Dept: OTOLARYNGOLOGY | Facility: CLINIC | Age: 37
End: 2022-03-21

## 2022-03-21 PROCEDURE — 97110 THERAPEUTIC EXERCISES: CPT

## 2022-03-21 PROCEDURE — 97140 MANUAL THERAPY 1/> REGIONS: CPT

## 2022-03-21 RX ORDER — CELECOXIB 200 MG/1
200 CAPSULE ORAL DAILY PRN
Qty: 30 CAPSULE | Refills: 0 | Status: SHIPPED | OUTPATIENT
Start: 2022-03-21 | End: 2022-04-20

## 2022-03-21 NOTE — TELEPHONE ENCOUNTER
Pharmacy request refill on:     Form placed in Providers Mailbox at [facility]    CELECOXIB CAPS 200MG (NOT ON MED LIST)

## 2022-03-24 ENCOUNTER — APPOINTMENT (OUTPATIENT)
Dept: PHYSICAL THERAPY | Age: 37
End: 2022-03-24
Attending: OTOLARYNGOLOGY
Payer: COMMERCIAL

## 2022-03-28 ENCOUNTER — OFFICE VISIT (OUTPATIENT)
Dept: PHYSICAL THERAPY | Age: 37
End: 2022-03-28
Attending: OTOLARYNGOLOGY
Payer: COMMERCIAL

## 2022-03-28 PROCEDURE — 97110 THERAPEUTIC EXERCISES: CPT

## 2022-04-18 ENCOUNTER — OFFICE VISIT (OUTPATIENT)
Dept: INTERNAL MEDICINE CLINIC | Facility: CLINIC | Age: 37
End: 2022-04-18
Payer: COMMERCIAL

## 2022-04-18 VITALS
BODY MASS INDEX: 34.76 KG/M2 | HEIGHT: 68 IN | OXYGEN SATURATION: 99 % | SYSTOLIC BLOOD PRESSURE: 110 MMHG | DIASTOLIC BLOOD PRESSURE: 78 MMHG | WEIGHT: 229.38 LBS | HEART RATE: 87 BPM

## 2022-04-18 DIAGNOSIS — E55.9 VITAMIN D DEFICIENCY: ICD-10-CM

## 2022-04-18 DIAGNOSIS — F32.A ANXIETY AND DEPRESSION: ICD-10-CM

## 2022-04-18 DIAGNOSIS — Z00.00 ANNUAL PHYSICAL EXAM: Primary | ICD-10-CM

## 2022-04-18 DIAGNOSIS — J45.40 MODERATE PERSISTENT ASTHMA WITHOUT COMPLICATION: ICD-10-CM

## 2022-04-18 DIAGNOSIS — F41.9 ANXIETY AND DEPRESSION: ICD-10-CM

## 2022-04-18 DIAGNOSIS — M26.609 TMJ (TEMPOROMANDIBULAR JOINT DISORDER): ICD-10-CM

## 2022-04-18 DIAGNOSIS — K51.90 ULCERATIVE COLITIS WITHOUT COMPLICATIONS, UNSPECIFIED LOCATION (HCC): ICD-10-CM

## 2022-04-18 LAB
ALBUMIN SERPL-MCNC: 3.8 G/DL (ref 3.4–5)
ALBUMIN/GLOB SERPL: 0.9 {RATIO} (ref 1–2)
ALP LIVER SERPL-CCNC: 88 U/L
ALT SERPL-CCNC: 46 U/L
ANION GAP SERPL CALC-SCNC: 6 MMOL/L (ref 0–18)
AST SERPL-CCNC: 22 U/L (ref 15–37)
BASOPHILS # BLD AUTO: 0.04 X10(3) UL (ref 0–0.2)
BASOPHILS NFR BLD AUTO: 0.5 %
BILIRUB SERPL-MCNC: 0.6 MG/DL (ref 0.1–2)
BUN BLD-MCNC: 13 MG/DL (ref 7–18)
BUN/CREAT SERPL: 13.4 (ref 10–20)
CALCIUM BLD-MCNC: 9 MG/DL (ref 8.5–10.1)
CHLORIDE SERPL-SCNC: 106 MMOL/L (ref 98–112)
CHOLEST SERPL-MCNC: 209 MG/DL (ref ?–200)
CO2 SERPL-SCNC: 27 MMOL/L (ref 21–32)
CREAT BLD-MCNC: 0.97 MG/DL
DEPRECATED RDW RBC AUTO: 40.9 FL (ref 35.1–46.3)
EOSINOPHIL # BLD AUTO: 0.39 X10(3) UL (ref 0–0.7)
EOSINOPHIL NFR BLD AUTO: 4.8 %
ERYTHROCYTE [DISTWIDTH] IN BLOOD BY AUTOMATED COUNT: 13.2 % (ref 11–15)
FASTING PATIENT LIPID ANSWER: YES
FASTING STATUS PATIENT QL REPORTED: YES
GLOBULIN PLAS-MCNC: 4.2 G/DL (ref 2.8–4.4)
GLUCOSE BLD-MCNC: 90 MG/DL (ref 70–99)
HCT VFR BLD AUTO: 50.2 %
HDLC SERPL-MCNC: 50 MG/DL (ref 40–59)
HGB BLD-MCNC: 16.5 G/DL
IMM GRANULOCYTES # BLD AUTO: 0.03 X10(3) UL (ref 0–1)
IMM GRANULOCYTES NFR BLD: 0.4 %
LDLC SERPL CALC-MCNC: 139 MG/DL (ref ?–100)
LYMPHOCYTES # BLD AUTO: 1.83 X10(3) UL (ref 1–4)
LYMPHOCYTES NFR BLD AUTO: 22.5 %
MCH RBC QN AUTO: 28.4 PG (ref 26–34)
MCHC RBC AUTO-ENTMCNC: 32.9 G/DL (ref 31–37)
MCV RBC AUTO: 86.3 FL
MONOCYTES # BLD AUTO: 0.52 X10(3) UL (ref 0.1–1)
MONOCYTES NFR BLD AUTO: 6.4 %
NEUTROPHILS # BLD AUTO: 5.33 X10 (3) UL (ref 1.5–7.7)
NEUTROPHILS # BLD AUTO: 5.33 X10(3) UL (ref 1.5–7.7)
NEUTROPHILS NFR BLD AUTO: 65.4 %
NONHDLC SERPL-MCNC: 159 MG/DL (ref ?–130)
OSMOLALITY SERPL CALC.SUM OF ELEC: 288 MOSM/KG (ref 275–295)
PLATELET # BLD AUTO: 297 10(3)UL (ref 150–450)
POTASSIUM SERPL-SCNC: 3.9 MMOL/L (ref 3.5–5.1)
PROT SERPL-MCNC: 8 G/DL (ref 6.4–8.2)
RBC # BLD AUTO: 5.82 X10(6)UL
SODIUM SERPL-SCNC: 139 MMOL/L (ref 136–145)
TRIGL SERPL-MCNC: 112 MG/DL (ref 30–149)
TSI SER-ACNC: 2.51 MIU/ML (ref 0.36–3.74)
VIT D+METAB SERPL-MCNC: 18.4 NG/ML (ref 30–100)
VLDLC SERPL CALC-MCNC: 21 MG/DL (ref 0–30)
WBC # BLD AUTO: 8.1 X10(3) UL (ref 4–11)

## 2022-04-18 PROCEDURE — 80050 GENERAL HEALTH PANEL: CPT | Performed by: INTERNAL MEDICINE

## 2022-04-18 PROCEDURE — 82306 VITAMIN D 25 HYDROXY: CPT | Performed by: INTERNAL MEDICINE

## 2022-04-18 PROCEDURE — 80061 LIPID PANEL: CPT | Performed by: INTERNAL MEDICINE

## 2022-04-18 PROCEDURE — 3008F BODY MASS INDEX DOCD: CPT | Performed by: INTERNAL MEDICINE

## 2022-04-18 PROCEDURE — 3074F SYST BP LT 130 MM HG: CPT | Performed by: INTERNAL MEDICINE

## 2022-04-18 PROCEDURE — 99395 PREV VISIT EST AGE 18-39: CPT | Performed by: INTERNAL MEDICINE

## 2022-04-18 PROCEDURE — 3078F DIAST BP <80 MM HG: CPT | Performed by: INTERNAL MEDICINE

## 2022-04-18 RX ORDER — FLUTICASONE PROPIONATE AND SALMETEROL 250; 50 UG/1; UG/1
1 POWDER RESPIRATORY (INHALATION) EVERY 12 HOURS SCHEDULED
Qty: 14 EACH | Refills: 0 | Status: SHIPPED | OUTPATIENT
Start: 2022-04-18

## 2022-04-18 RX ORDER — ALBUTEROL SULFATE 90 UG/1
2 AEROSOL, METERED RESPIRATORY (INHALATION) EVERY 6 HOURS PRN
Qty: 18 G | Refills: 0 | Status: SHIPPED | OUTPATIENT
Start: 2022-04-18

## 2022-04-19 RX ORDER — ERGOCALCIFEROL 1.25 MG/1
50000 CAPSULE ORAL WEEKLY
Qty: 12 CAPSULE | Refills: 1 | Status: SHIPPED | OUTPATIENT
Start: 2022-04-19 | End: 2022-07-06

## 2022-08-08 ENCOUNTER — OFFICE VISIT (OUTPATIENT)
Dept: INTERNAL MEDICINE CLINIC | Facility: CLINIC | Age: 37
End: 2022-08-08
Payer: COMMERCIAL

## 2022-08-08 VITALS
DIASTOLIC BLOOD PRESSURE: 86 MMHG | SYSTOLIC BLOOD PRESSURE: 120 MMHG | BODY MASS INDEX: 35.89 KG/M2 | OXYGEN SATURATION: 100 % | HEIGHT: 68 IN | HEART RATE: 78 BPM | WEIGHT: 236.81 LBS

## 2022-08-08 DIAGNOSIS — S69.92XD INJURY OF FINGER OF LEFT HAND, SUBSEQUENT ENCOUNTER: Primary | ICD-10-CM

## 2022-08-08 DIAGNOSIS — R79.89 ABNORMAL CBC: ICD-10-CM

## 2022-08-08 LAB
BASOPHILS # BLD AUTO: 0.04 X10(3) UL (ref 0–0.2)
BASOPHILS NFR BLD AUTO: 0.4 %
DEPRECATED RDW RBC AUTO: 42.4 FL (ref 35.1–46.3)
EOSINOPHIL # BLD AUTO: 0.39 X10(3) UL (ref 0–0.7)
EOSINOPHIL NFR BLD AUTO: 4 %
ERYTHROCYTE [DISTWIDTH] IN BLOOD BY AUTOMATED COUNT: 13.4 % (ref 11–15)
HCT VFR BLD AUTO: 48.8 %
HGB BLD-MCNC: 16.1 G/DL
IMM GRANULOCYTES # BLD AUTO: 0.05 X10(3) UL (ref 0–1)
IMM GRANULOCYTES NFR BLD: 0.5 %
LYMPHOCYTES # BLD AUTO: 2.75 X10(3) UL (ref 1–4)
LYMPHOCYTES NFR BLD AUTO: 28.1 %
MCH RBC QN AUTO: 28.5 PG (ref 26–34)
MCHC RBC AUTO-ENTMCNC: 33 G/DL (ref 31–37)
MCV RBC AUTO: 86.4 FL
MONOCYTES # BLD AUTO: 0.69 X10(3) UL (ref 0.1–1)
MONOCYTES NFR BLD AUTO: 7 %
NEUTROPHILS # BLD AUTO: 5.87 X10 (3) UL (ref 1.5–7.7)
NEUTROPHILS # BLD AUTO: 5.87 X10(3) UL (ref 1.5–7.7)
NEUTROPHILS NFR BLD AUTO: 60 %
PLATELET # BLD AUTO: 326 10(3)UL (ref 150–450)
RBC # BLD AUTO: 5.65 X10(6)UL
WBC # BLD AUTO: 9.8 X10(3) UL (ref 4–11)

## 2022-08-08 PROCEDURE — 3079F DIAST BP 80-89 MM HG: CPT | Performed by: INTERNAL MEDICINE

## 2022-08-08 PROCEDURE — 3008F BODY MASS INDEX DOCD: CPT | Performed by: INTERNAL MEDICINE

## 2022-08-08 PROCEDURE — 85025 COMPLETE CBC W/AUTO DIFF WBC: CPT | Performed by: INTERNAL MEDICINE

## 2022-08-08 PROCEDURE — 3074F SYST BP LT 130 MM HG: CPT | Performed by: INTERNAL MEDICINE

## 2022-08-08 PROCEDURE — 99213 OFFICE O/P EST LOW 20 MIN: CPT | Performed by: INTERNAL MEDICINE

## 2022-08-09 ENCOUNTER — HOSPITAL ENCOUNTER (OUTPATIENT)
Dept: GENERAL RADIOLOGY | Age: 37
Discharge: HOME | End: 2022-08-09
Attending: INTERNAL MEDICINE
Payer: COMMERCIAL

## 2022-08-09 DIAGNOSIS — S69.92XD INJURY OF FINGER OF LEFT HAND, SUBSEQUENT ENCOUNTER: ICD-10-CM

## 2022-08-09 DIAGNOSIS — R79.89 ABNORMAL CBC: ICD-10-CM

## 2022-08-09 PROCEDURE — 73140 X-RAY EXAM OF FINGER(S): CPT | Performed by: INTERNAL MEDICINE

## 2022-08-10 DIAGNOSIS — S69.90XD FINGER INJURY, UNSPECIFIED LATERALITY, SUBSEQUENT ENCOUNTER: Primary | ICD-10-CM

## 2022-08-29 ENCOUNTER — OFFICE VISIT (OUTPATIENT)
Dept: OTOLARYNGOLOGY | Facility: CLINIC | Age: 37
End: 2022-08-29
Payer: COMMERCIAL

## 2022-08-29 DIAGNOSIS — R68.84 JAW PAIN: Primary | ICD-10-CM

## 2022-08-29 PROCEDURE — 99213 OFFICE O/P EST LOW 20 MIN: CPT | Performed by: OTOLARYNGOLOGY

## 2022-08-29 RX ORDER — CYCLOBENZAPRINE HCL 5 MG
5 TABLET ORAL NIGHTLY
Qty: 30 TABLET | Refills: 1 | Status: SHIPPED | OUTPATIENT
Start: 2022-08-29

## 2022-08-29 RX ORDER — CELECOXIB 200 MG/1
200 CAPSULE ORAL DAILY PRN
Qty: 30 CAPSULE | Refills: 1 | Status: SHIPPED | OUTPATIENT
Start: 2022-08-29 | End: 2022-09-28

## 2022-09-20 ENCOUNTER — PATIENT MESSAGE (OUTPATIENT)
Dept: INTERNAL MEDICINE CLINIC | Facility: CLINIC | Age: 37
End: 2022-09-20

## 2022-09-20 RX ORDER — SERTRALINE HYDROCHLORIDE 100 MG/1
100 TABLET, FILM COATED ORAL DAILY
Qty: 90 TABLET | Refills: 3 | Status: SHIPPED | OUTPATIENT
Start: 2022-09-20 | End: 2022-12-19

## 2022-10-21 ENCOUNTER — OFFICE VISIT (OUTPATIENT)
Dept: OTOLARYNGOLOGY | Facility: CLINIC | Age: 37
End: 2022-10-21
Payer: COMMERCIAL

## 2022-10-21 VITALS — TEMPERATURE: 98 F

## 2022-10-21 DIAGNOSIS — H92.03 OTALGIA OF BOTH EARS: Primary | ICD-10-CM

## 2022-10-21 PROCEDURE — 99213 OFFICE O/P EST LOW 20 MIN: CPT | Performed by: OTOLARYNGOLOGY

## 2022-11-07 RX ORDER — CELECOXIB 200 MG/1
CAPSULE ORAL
Qty: 30 CAPSULE | Refills: 0 | Status: SHIPPED | OUTPATIENT
Start: 2022-11-07

## 2022-11-14 ENCOUNTER — TELEPHONE (OUTPATIENT)
Dept: PHYSICAL THERAPY | Facility: HOSPITAL | Age: 37
End: 2022-11-14

## 2022-11-16 ENCOUNTER — OFFICE VISIT (OUTPATIENT)
Dept: PHYSICAL THERAPY | Age: 37
End: 2022-11-16
Attending: OTOLARYNGOLOGY
Payer: COMMERCIAL

## 2022-11-16 DIAGNOSIS — H92.03 OTALGIA OF BOTH EARS: ICD-10-CM

## 2022-11-16 PROCEDURE — 97110 THERAPEUTIC EXERCISES: CPT

## 2022-11-16 PROCEDURE — 97161 PT EVAL LOW COMPLEX 20 MIN: CPT

## 2022-11-16 PROCEDURE — 97112 NEUROMUSCULAR REEDUCATION: CPT

## 2022-11-17 DIAGNOSIS — F41.9 ANXIETY AND DEPRESSION: ICD-10-CM

## 2022-11-17 DIAGNOSIS — E55.9 VITAMIN D DEFICIENCY: ICD-10-CM

## 2022-11-17 DIAGNOSIS — Z00.00 ANNUAL PHYSICAL EXAM: ICD-10-CM

## 2022-11-17 DIAGNOSIS — F32.A ANXIETY AND DEPRESSION: ICD-10-CM

## 2022-11-17 DIAGNOSIS — J45.40 MODERATE PERSISTENT ASTHMA WITHOUT COMPLICATION: ICD-10-CM

## 2022-11-17 DIAGNOSIS — M26.609 TMJ (TEMPOROMANDIBULAR JOINT DISORDER): ICD-10-CM

## 2022-11-17 RX ORDER — FLUTICASONE PROPIONATE AND SALMETEROL 250; 50 UG/1; UG/1
1 POWDER RESPIRATORY (INHALATION) EVERY 12 HOURS SCHEDULED
Qty: 14 EACH | Refills: 0 | Status: SHIPPED | OUTPATIENT
Start: 2022-11-17

## 2022-11-17 RX ORDER — ALBUTEROL SULFATE 90 UG/1
2 AEROSOL, METERED RESPIRATORY (INHALATION) EVERY 6 HOURS PRN
Qty: 18 G | Refills: 0 | Status: SHIPPED | OUTPATIENT
Start: 2022-11-17

## 2022-11-21 ENCOUNTER — OFFICE VISIT (OUTPATIENT)
Dept: PHYSICAL THERAPY | Age: 37
End: 2022-11-21
Attending: OTOLARYNGOLOGY
Payer: COMMERCIAL

## 2022-11-21 PROCEDURE — 97112 NEUROMUSCULAR REEDUCATION: CPT

## 2022-11-21 PROCEDURE — 97140 MANUAL THERAPY 1/> REGIONS: CPT

## 2022-11-21 PROCEDURE — 97110 THERAPEUTIC EXERCISES: CPT

## 2022-11-21 NOTE — PROGRESS NOTES
Diagnosis:   R68.84      Referring Provider: Risa Montanez  Date of Evaluation:    11/16/2022    Precautions:  None Next MD visit:   none scheduled  Date of Surgery: n/a   Insurance Primary/Secondary: [place] / N/[place]               Subjective: No change since IE    Pain: 3/10      Objective: Pain worst with talking  Under bite  C2-3 hypomobility compared to opposite side      Assessment: Pt reports no real change in symptoms following cervical mobs. Cervical ROM improves but this is not what he is here for. Next visit, barring a break thru in symptom reduction, we will examine extra and intraoral soft tissue and TMJ joint restrictions as pain drivers. Goals:   (to be met in 12 visits)   1) Competent with HEP  2) Full cervical ROM for regional interdependence. 3) Proper resting posture of TMJ for offloading  4) No further jaw pain with work function above 1/10    Plan: Assess soft tissue and joint restrictins of TMJ      Manual -C2-3 UPAs  -SCM STM  -Cervical traction, upper bias   Therex -Repeated motions   Neuro -Towel MWM   Theract    Estim    HEP: Towel MWM    Charges:  Therex, Neuro, Manual x2       Total Timed Treatment: 40 min  Total Treatment Time: 42 min

## 2022-11-25 ENCOUNTER — TELEPHONE (OUTPATIENT)
Dept: PHYSICAL THERAPY | Facility: HOSPITAL | Age: 37
End: 2022-11-25

## 2022-11-28 ENCOUNTER — APPOINTMENT (OUTPATIENT)
Dept: PHYSICAL THERAPY | Age: 37
End: 2022-11-28
Attending: OTOLARYNGOLOGY
Payer: COMMERCIAL

## 2022-11-30 ENCOUNTER — OFFICE VISIT (OUTPATIENT)
Dept: PHYSICAL THERAPY | Age: 37
End: 2022-11-30
Attending: OTOLARYNGOLOGY
Payer: COMMERCIAL

## 2022-11-30 PROCEDURE — 97140 MANUAL THERAPY 1/> REGIONS: CPT

## 2022-11-30 PROCEDURE — 97112 NEUROMUSCULAR REEDUCATION: CPT

## 2022-11-30 PROCEDURE — 97110 THERAPEUTIC EXERCISES: CPT

## 2022-12-01 NOTE — PROGRESS NOTES
Diagnosis:   R68.84      Referring Provider: Micki Wang  Date of Evaluation:    11/16/2022    Precautions:  None Next MD visit:   none scheduled  Date of Surgery: n/a   Insurance Primary/Secondary: [place] / N/[place]               Subjective: No change since IE    Pain: 3/10      Objective: Pain worst with talking  Under bite  C2-3 hypomobility compared to opposite side      Assessment: Pt reports no real change in symptoms following cervical mobs. Went at TMJ today and had relief with STM to intra and extraoral musculature. Goals:   (to be met in 12 visits)   1) Competent with HEP  2) Full cervical ROM for regional interdependence. 3) Proper resting posture of TMJ for offloading  4) No further jaw pain with work function above 1/10    Plan: Assess soft tissue and joint restrictins of TMJ      Manual -C2-3 UPAs  -SCM STM  -Cervical traction, upper bias  -Intraoral STM  Med/Lat pterygoid  -STM masseter  -TMJ joint mob inf glide grade 3 oscillatory   Therex -Repeated motions  -Anatomy education   Neuro -Marissa COONEY   Theract    Estim    HEP: Marissa COONEY    Charges:  Therex, Neuro, Manual x2       Total Timed Treatment: 40 min  Total Treatment Time: 42 min

## 2022-12-05 ENCOUNTER — APPOINTMENT (OUTPATIENT)
Dept: PHYSICAL THERAPY | Age: 37
End: 2022-12-05
Attending: OTOLARYNGOLOGY
Payer: COMMERCIAL

## 2022-12-07 ENCOUNTER — OFFICE VISIT (OUTPATIENT)
Dept: PHYSICAL THERAPY | Age: 37
End: 2022-12-07
Attending: OTOLARYNGOLOGY
Payer: COMMERCIAL

## 2022-12-07 PROCEDURE — 97110 THERAPEUTIC EXERCISES: CPT

## 2022-12-07 PROCEDURE — 97112 NEUROMUSCULAR REEDUCATION: CPT

## 2022-12-07 PROCEDURE — 97140 MANUAL THERAPY 1/> REGIONS: CPT

## 2022-12-08 NOTE — PROGRESS NOTES
Diagnosis:   R68.84      Referring Provider: Mina Lara  Date of Evaluation:    11/16/2022    Precautions:  None Next MD visit:   none scheduled  Date of Surgery: n/a   [place]urance Primary/Secondary: [place] / N/[place]               Subjective: Intraoral massage seems to have helped. Pain: 3/10      Objective: Pain worst with talking  Under bite  C2-3 hypomobility compared to opposite side  TTP in genioglossus      Assessment: Reports improvement with intra and extra oral massage today, reduced pain and increased excursion with tounge protraction following tx. Goals:   (to be met in 12 visits)   1) Competent with HEP  2) Full cervical ROM for regional interdependence. 3) Proper resting posture of TMJ for offloading  4) No further jaw pain with work function above 1/10    Plan: Assess soft tissue and joint restrictins of TMJ      Manual -C2-3 UPAs  -SCM STM  -Cervical traction, upper bias  -Intraoral STM  Med/Lat pterygoid  -STM masseter  -TMJ joint mob inf glide grade 3 oscillatory  -Genioglosus STM   Therex -Repeated motions  -Anatomy education   Neuro -Towel MWM  -Tounge pr/retraction   Theract    Estim    HEP: Toweloisa MWM    Charges:  Therex, Neuro, Manual x2       Total Timed Treatment: 40 min  Total Treatment Time: 42 min

## 2022-12-14 ENCOUNTER — OFFICE VISIT (OUTPATIENT)
Dept: PHYSICAL THERAPY | Age: 37
End: 2022-12-14
Attending: OTOLARYNGOLOGY
Payer: COMMERCIAL

## 2022-12-14 PROCEDURE — 97112 NEUROMUSCULAR REEDUCATION: CPT

## 2022-12-14 PROCEDURE — 97140 MANUAL THERAPY 1/> REGIONS: CPT

## 2022-12-14 PROCEDURE — 97110 THERAPEUTIC EXERCISES: CPT

## 2022-12-15 NOTE — PROGRESS NOTES
Diagnosis:   R68.84      Referring Provider: Mina Lara  Date of Evaluation:    11/16/2022    Precautions:  None Next MD visit:   none scheduled  Date of Surgery: n/a   [place] Primary/Secondary: [place] / N/[place]               Subjective: Intraoral massage seems to have helped. Pain: 3/10      Objective: Pain worst with talking  Under bite  C2-3 hypomobility compared to opposite side  TTP in genioglossus      Assessment: STM did not change base line symptoms today. Tongue protrusion remains full which is a good sign. Trial of US next tx. Goals:   (to be met in 12 visits)   1) Competent with HEP  2) Full cervical ROM for regional interdependence. 3) Proper resting posture of TMJ for offloading  4) No further jaw pain with work function above 1/10    Plan: Assess soft tissue and joint restrictins of TMJ      Manual -C2-3 UPAs  -SCM STM  -Cervical traction, upper bias  -Intraoral STM  Med/Lat pterygoid  -STM masseter  -TMJ joint mob inf glide grade 3 oscillatory  -Genioglosus STM   Therex -Repeated motions  -Anatomy education   Neuro -Marissa MWCHALO  -Tounge pr/retraction   Theract    Estim    HEP: Marissa COONEY    Charges:  Therex, Neuro, Manual x2       Total Timed Treatment: 40 min  Total Treatment Time: 42 min

## 2022-12-21 ENCOUNTER — APPOINTMENT (OUTPATIENT)
Dept: PHYSICAL THERAPY | Age: 37
End: 2022-12-21
Attending: OTOLARYNGOLOGY
Payer: COMMERCIAL

## 2022-12-21 PROCEDURE — 97110 THERAPEUTIC EXERCISES: CPT

## 2022-12-21 PROCEDURE — 97140 MANUAL THERAPY 1/> REGIONS: CPT

## 2022-12-21 PROCEDURE — 97035 APP MDLTY 1+ULTRASOUND EA 15: CPT

## 2022-12-21 PROCEDURE — 97112 NEUROMUSCULAR REEDUCATION: CPT

## 2022-12-22 NOTE — PROGRESS NOTES
Diagnosis:   R68.84      Referring Provider: Soo Almaraz  Date of Evaluation:    11/16/2022    Precautions:  None Next MD visit:   none scheduled  Date of Surgery: n/a   [place] Primary/Secondary: [place] / N[place]               Subjective: Intraoral massage seems to have helped. Pain: 3/10      Objective: Pain worst with talking  Under bite  C2-3 hypomobility compared to opposite side  TTP in genioglossus      Assessment: Trial of US today with good subjective report. Also included mid cspine and upper thoracic manipulations per evidence on TMJ pain. Goals:   (to be met in 12 visits)   1) Competent with HEP  2) Full cervical ROM for regional interdependence. 3) Proper resting posture of TMJ for offloading  4) No further jaw pain with work function above 1/10    Plan: Assess soft tissue and joint restrictins of TMJ      Manual -C2-3 UPAs  -SCM STM  -Cervical traction, upper bias  -Intraoral STM  Med/Lat pterygoid  -STM masseter  -TMJ joint mob inf glide grade 3 oscillatory  -Mid Cspine manipulation B  -Upper thoracic seated manip   Therex -Repeated motions  -Anatomy education   Neuro -Towel MWM  -Tounge pr/retraction   Theract    Estim IFC   HEP: Marissa MWCHALO    Charges:  Therex, Neuro, Manual x2, Estim       Total Timed Treatment: 40 min  Total Treatment Time: 42 min

## 2022-12-28 ENCOUNTER — OFFICE VISIT (OUTPATIENT)
Dept: PHYSICAL THERAPY | Age: 37
End: 2022-12-28
Attending: OTOLARYNGOLOGY
Payer: COMMERCIAL

## 2022-12-28 PROCEDURE — 97112 NEUROMUSCULAR REEDUCATION: CPT

## 2022-12-28 PROCEDURE — 97140 MANUAL THERAPY 1/> REGIONS: CPT

## 2022-12-28 NOTE — PROGRESS NOTES
Diagnosis:   R68.84      Referring Provider: Tom Farrell  Date of Evaluation:    11/16/2022    Precautions:  None Next MD visit:   none scheduled  Date of Surgery: n/a   Insurance Primary/Secondary: [place] / N/[place]               Subjective: Sleeping better, able to talk longer pain free. 75% total improvement. Pain: 3/10      Objective: Full opening and cervical ROM  Triggers subsist in temporalis and hypoglossus but less severe      Assessment: Pt seen 7 visits addressing inflammation control, intraoral trigger points, extraoral STM including US and cervical and thoracic manipulation with education postural influence at the jaw. Pt has made subjective 75% improvement. No longer waking at night and sensation in anterior lateral jaw is normalized. Still having pain with chewing prolonged periods and talking too long. Goals:   (to be met in 12 visits)   1) Competent with HEP  2) Full cervical ROM for regional interdependence.    3) Proper resting posture of TMJ for offloading  4) No further jaw pain with work function above 1/10    Plan: Assess soft tissue and joint restrictins of TMJ      Manual -C2-3 UPAs  -SCM STM  -Cervical traction, upper bias  -Intraoral STM  Med/Lat pterygoid  -STM masseter  -TMJ joint mob inf glide grade 3 oscillatory  -Mid Cspine manipulation B  -Upper thoracic seated manip   Therex -Repeated motions  -Anatomy education   Neuro -Marissa COONEY  -Tounge pr/retraction   Theract    Estim    HEP: Marissa COONEY    Charges: Neuro, Manual x3       Total Timed Treatment: 48 min  Total Treatment Time: 49 min

## 2023-02-16 ENCOUNTER — OFFICE VISIT (OUTPATIENT)
Dept: GASTROENTEROLOGY | Facility: CLINIC | Age: 38
End: 2023-02-16

## 2023-02-16 VITALS
HEART RATE: 90 BPM | DIASTOLIC BLOOD PRESSURE: 87 MMHG | BODY MASS INDEX: 35.31 KG/M2 | SYSTOLIC BLOOD PRESSURE: 142 MMHG | HEIGHT: 68 IN | WEIGHT: 233 LBS

## 2023-02-16 DIAGNOSIS — K51.919 ULCERATIVE COLITIS WITH COMPLICATION, UNSPECIFIED LOCATION (HCC): Primary | ICD-10-CM

## 2023-02-16 PROCEDURE — 3079F DIAST BP 80-89 MM HG: CPT | Performed by: INTERNAL MEDICINE

## 2023-02-16 PROCEDURE — 3077F SYST BP >= 140 MM HG: CPT | Performed by: INTERNAL MEDICINE

## 2023-02-16 PROCEDURE — 3008F BODY MASS INDEX DOCD: CPT | Performed by: INTERNAL MEDICINE

## 2023-02-16 PROCEDURE — 99244 OFF/OP CNSLTJ NEW/EST MOD 40: CPT | Performed by: INTERNAL MEDICINE

## 2023-02-16 NOTE — PATIENT INSTRUCTIONS
Blood/stool tests  Avoid NSAIDS  Will likely start Lialda soon  See me again in 3-4 months  Soft diet, avoid high fiber

## 2023-02-17 ENCOUNTER — LAB ENCOUNTER (OUTPATIENT)
Dept: LAB | Age: 38
End: 2023-02-17
Attending: INTERNAL MEDICINE
Payer: COMMERCIAL

## 2023-02-17 DIAGNOSIS — K51.919 ULCERATIVE COLITIS WITH COMPLICATION, UNSPECIFIED LOCATION (HCC): ICD-10-CM

## 2023-02-17 LAB
ALBUMIN SERPL-MCNC: 4 G/DL (ref 3.4–5)
ALBUMIN/GLOB SERPL: 1 {RATIO} (ref 1–2)
ALP LIVER SERPL-CCNC: 93 U/L
ALT SERPL-CCNC: 73 U/L
ANION GAP SERPL CALC-SCNC: 7 MMOL/L (ref 0–18)
AST SERPL-CCNC: 36 U/L (ref 15–37)
BASOPHILS # BLD AUTO: 0.06 X10(3) UL (ref 0–0.2)
BASOPHILS NFR BLD AUTO: 0.6 %
BILIRUB SERPL-MCNC: 0.5 MG/DL (ref 0.1–2)
BUN BLD-MCNC: 18 MG/DL (ref 7–18)
BUN/CREAT SERPL: 17.5 (ref 10–20)
CALCIUM BLD-MCNC: 9.3 MG/DL (ref 8.5–10.1)
CHLORIDE SERPL-SCNC: 107 MMOL/L (ref 98–112)
CO2 SERPL-SCNC: 28 MMOL/L (ref 21–32)
CREAT BLD-MCNC: 1.03 MG/DL
CRP SERPL-MCNC: <0.29 MG/DL (ref ?–0.3)
DEPRECATED RDW RBC AUTO: 39.4 FL (ref 35.1–46.3)
EOSINOPHIL # BLD AUTO: 0.32 X10(3) UL (ref 0–0.7)
EOSINOPHIL NFR BLD AUTO: 3.5 %
ERYTHROCYTE [DISTWIDTH] IN BLOOD BY AUTOMATED COUNT: 12.8 % (ref 11–15)
FASTING STATUS PATIENT QL REPORTED: NO
GFR SERPLBLD BASED ON 1.73 SQ M-ARVRAT: 96 ML/MIN/1.73M2 (ref 60–?)
GLOBULIN PLAS-MCNC: 4.1 G/DL (ref 2.8–4.4)
GLUCOSE BLD-MCNC: 94 MG/DL (ref 70–99)
HCT VFR BLD AUTO: 47.9 %
HGB BLD-MCNC: 16.2 G/DL
IMM GRANULOCYTES # BLD AUTO: 0.02 X10(3) UL (ref 0–1)
IMM GRANULOCYTES NFR BLD: 0.2 %
LYMPHOCYTES # BLD AUTO: 3.18 X10(3) UL (ref 1–4)
LYMPHOCYTES NFR BLD AUTO: 34.4 %
MCH RBC QN AUTO: 28.5 PG (ref 26–34)
MCHC RBC AUTO-ENTMCNC: 33.8 G/DL (ref 31–37)
MCV RBC AUTO: 84.2 FL
MONOCYTES # BLD AUTO: 0.57 X10(3) UL (ref 0.1–1)
MONOCYTES NFR BLD AUTO: 6.2 %
NEUTROPHILS # BLD AUTO: 5.09 X10 (3) UL (ref 1.5–7.7)
NEUTROPHILS # BLD AUTO: 5.09 X10(3) UL (ref 1.5–7.7)
NEUTROPHILS NFR BLD AUTO: 55.1 %
OSMOLALITY SERPL CALC.SUM OF ELEC: 296 MOSM/KG (ref 275–295)
PLATELET # BLD AUTO: 344 10(3)UL (ref 150–450)
POTASSIUM SERPL-SCNC: 3.9 MMOL/L (ref 3.5–5.1)
PROT SERPL-MCNC: 8.1 G/DL (ref 6.4–8.2)
RBC # BLD AUTO: 5.69 X10(6)UL
SODIUM SERPL-SCNC: 142 MMOL/L (ref 136–145)
TSI SER-ACNC: 2.93 MIU/ML (ref 0.36–3.74)
VIT B12 SERPL-MCNC: 488 PG/ML (ref 193–986)
VIT D+METAB SERPL-MCNC: 16.2 NG/ML (ref 30–100)
WBC # BLD AUTO: 9.2 X10(3) UL (ref 4–11)

## 2023-02-17 PROCEDURE — 82607 VITAMIN B-12: CPT

## 2023-02-17 PROCEDURE — 82306 VITAMIN D 25 HYDROXY: CPT

## 2023-02-17 PROCEDURE — 84443 ASSAY THYROID STIM HORMONE: CPT

## 2023-02-17 PROCEDURE — 85025 COMPLETE CBC W/AUTO DIFF WBC: CPT

## 2023-02-17 PROCEDURE — 80053 COMPREHEN METABOLIC PANEL: CPT

## 2023-02-17 PROCEDURE — 86140 C-REACTIVE PROTEIN: CPT

## 2023-02-17 PROCEDURE — 36415 COLL VENOUS BLD VENIPUNCTURE: CPT

## 2023-02-18 ENCOUNTER — LAB ENCOUNTER (OUTPATIENT)
Dept: LAB | Age: 38
End: 2023-02-18
Attending: INTERNAL MEDICINE
Payer: COMMERCIAL

## 2023-02-18 PROCEDURE — 87493 C DIFF AMPLIFIED PROBE: CPT

## 2023-02-18 PROCEDURE — 83993 ASSAY FOR CALPROTECTIN FECAL: CPT | Performed by: INTERNAL MEDICINE

## 2023-02-20 ENCOUNTER — TELEPHONE (OUTPATIENT)
Dept: GASTROENTEROLOGY | Facility: CLINIC | Age: 38
End: 2023-02-20

## 2023-02-20 LAB — C DIFF TOX B STL QL: NEGATIVE

## 2023-02-20 RX ORDER — ERGOCALCIFEROL 1.25 MG/1
50000 CAPSULE ORAL
Qty: 8 CAPSULE | Refills: 0 | Status: SHIPPED | OUTPATIENT
Start: 2023-02-20 | End: 2023-04-11

## 2023-02-20 RX ORDER — MESALAMINE 1.2 G/1
2.4 TABLET, DELAYED RELEASE ORAL DAILY
Qty: 180 TABLET | Refills: 1 | Status: SHIPPED | OUTPATIENT
Start: 2023-02-20 | End: 2023-08-19

## 2023-02-20 NOTE — TELEPHONE ENCOUNTER
D/w patient:    -low Vit D--->replacement sent  -okay to start Lialda 2.4g/day sent  -see me in clinic in 2-3 months

## 2023-02-21 LAB — CALPROTECTIN STL-MCNT: 40.7 ΜG/G (ref ?–50)

## 2023-02-27 DIAGNOSIS — F32.A ANXIETY AND DEPRESSION: ICD-10-CM

## 2023-02-27 DIAGNOSIS — J45.40 MODERATE PERSISTENT ASTHMA WITHOUT COMPLICATION: ICD-10-CM

## 2023-02-27 DIAGNOSIS — E55.9 VITAMIN D DEFICIENCY: ICD-10-CM

## 2023-02-27 DIAGNOSIS — M26.609 TMJ (TEMPOROMANDIBULAR JOINT DISORDER): ICD-10-CM

## 2023-02-27 DIAGNOSIS — Z00.00 ANNUAL PHYSICAL EXAM: ICD-10-CM

## 2023-02-27 DIAGNOSIS — F41.9 ANXIETY AND DEPRESSION: ICD-10-CM

## 2023-02-27 RX ORDER — ALBUTEROL SULFATE 90 UG/1
2 AEROSOL, METERED RESPIRATORY (INHALATION) EVERY 6 HOURS PRN
Qty: 18 G | Refills: 0 | Status: SHIPPED | OUTPATIENT
Start: 2023-02-27

## 2023-02-27 RX ORDER — FLUTICASONE PROPIONATE AND SALMETEROL 250; 50 UG/1; UG/1
1 POWDER RESPIRATORY (INHALATION) EVERY 12 HOURS SCHEDULED
Qty: 14 EACH | Refills: 0 | Status: SHIPPED | OUTPATIENT
Start: 2023-02-27

## 2023-02-27 RX ORDER — SERTRALINE HYDROCHLORIDE 100 MG/1
100 TABLET, FILM COATED ORAL DAILY
COMMUNITY
End: 2023-02-27

## 2023-02-27 RX ORDER — SERTRALINE HYDROCHLORIDE 100 MG/1
100 TABLET, FILM COATED ORAL DAILY
Qty: 90 TABLET | Refills: 1 | Status: SHIPPED | OUTPATIENT
Start: 2023-02-27 | End: 2023-05-28

## 2023-05-06 ENCOUNTER — OFFICE VISIT (OUTPATIENT)
Dept: INTERNAL MEDICINE CLINIC | Facility: CLINIC | Age: 38
End: 2023-05-06
Payer: COMMERCIAL

## 2023-05-06 VITALS
BODY MASS INDEX: 36.65 KG/M2 | DIASTOLIC BLOOD PRESSURE: 80 MMHG | SYSTOLIC BLOOD PRESSURE: 120 MMHG | OXYGEN SATURATION: 98 % | HEART RATE: 78 BPM | HEIGHT: 68 IN | WEIGHT: 241.81 LBS

## 2023-05-06 DIAGNOSIS — E55.9 VITAMIN D DEFICIENCY: ICD-10-CM

## 2023-05-06 DIAGNOSIS — Z76.89 SLEEP CONCERN: ICD-10-CM

## 2023-05-06 DIAGNOSIS — J45.20 MILD INTERMITTENT ASTHMA, UNSPECIFIED WHETHER COMPLICATED: ICD-10-CM

## 2023-05-06 DIAGNOSIS — F41.9 ANXIETY AND DEPRESSION: ICD-10-CM

## 2023-05-06 DIAGNOSIS — M26.609 TMJ (TEMPOROMANDIBULAR JOINT DISORDER): ICD-10-CM

## 2023-05-06 DIAGNOSIS — Z00.00 ANNUAL PHYSICAL EXAM: Primary | ICD-10-CM

## 2023-05-06 DIAGNOSIS — F32.A ANXIETY AND DEPRESSION: ICD-10-CM

## 2023-05-06 DIAGNOSIS — K51.919 ULCERATIVE COLITIS WITH COMPLICATION, UNSPECIFIED LOCATION (HCC): ICD-10-CM

## 2023-05-06 DIAGNOSIS — R53.83 OTHER FATIGUE: ICD-10-CM

## 2023-05-06 DIAGNOSIS — E04.9 ENLARGED THYROID: ICD-10-CM

## 2023-05-06 DIAGNOSIS — R06.83 SNORING: ICD-10-CM

## 2023-05-06 PROCEDURE — 3074F SYST BP LT 130 MM HG: CPT | Performed by: INTERNAL MEDICINE

## 2023-05-06 PROCEDURE — 99395 PREV VISIT EST AGE 18-39: CPT | Performed by: INTERNAL MEDICINE

## 2023-05-06 PROCEDURE — 3008F BODY MASS INDEX DOCD: CPT | Performed by: INTERNAL MEDICINE

## 2023-05-06 PROCEDURE — 3079F DIAST BP 80-89 MM HG: CPT | Performed by: INTERNAL MEDICINE

## 2023-06-22 ENCOUNTER — TELEPHONE (OUTPATIENT)
Facility: CLINIC | Age: 38
End: 2023-06-22

## 2023-06-22 ENCOUNTER — OFFICE VISIT (OUTPATIENT)
Dept: GASTROENTEROLOGY | Facility: CLINIC | Age: 38
End: 2023-06-22

## 2023-06-22 VITALS
HEIGHT: 68 IN | WEIGHT: 245 LBS | SYSTOLIC BLOOD PRESSURE: 120 MMHG | DIASTOLIC BLOOD PRESSURE: 80 MMHG | BODY MASS INDEX: 37.13 KG/M2

## 2023-06-22 DIAGNOSIS — K51.919 ULCERATIVE COLITIS WITH COMPLICATION, UNSPECIFIED LOCATION (HCC): Primary | ICD-10-CM

## 2023-06-22 PROCEDURE — 3079F DIAST BP 80-89 MM HG: CPT | Performed by: INTERNAL MEDICINE

## 2023-06-22 PROCEDURE — 3074F SYST BP LT 130 MM HG: CPT | Performed by: INTERNAL MEDICINE

## 2023-06-22 PROCEDURE — 3008F BODY MASS INDEX DOCD: CPT | Performed by: INTERNAL MEDICINE

## 2023-06-22 PROCEDURE — 99214 OFFICE O/P EST MOD 30 MIN: CPT | Performed by: INTERNAL MEDICINE

## 2023-06-22 RX ORDER — MESALAMINE 1.2 G/1
2.4 TABLET, DELAYED RELEASE ORAL DAILY
Qty: 180 TABLET | Refills: 1 | Status: SHIPPED | OUTPATIENT
Start: 2023-06-22 | End: 2023-12-19

## 2023-06-22 RX ORDER — MESALAMINE 1.2 G/1
2.4 TABLET, DELAYED RELEASE ORAL DAILY
Qty: 180 TABLET | Refills: 1 | Status: SHIPPED | OUTPATIENT
Start: 2023-06-22 | End: 2023-06-22

## 2023-06-22 NOTE — TELEPHONE ENCOUNTER
Scheduled for:  Colonoscopy [id number]  Provider Name:  Dr Mitchell Harris  Date:  10/17/2023  Location:  [facility]   Sedation:  MAC  Time:  10:30am (Patient is aware arrival time is at 9:30am)  Prep:  Trilyte Prep Instructions Given At The Office Visit. Meds/Allergies Reconciled?:  Physician Reviewed  Diagnosis with codes: Uclerative Colitis K51.90   Was patient informed to call insurance with codes (Y/N):  Yes  Referral sent?:  Referral was sent at the time of electronic surgical scheduling. [facility] or [address] notified?:  I sent an electronic request to Endo Scheduling and received a confirmation today. Medication Orders:  Pt is aware to NOT take iron pills, herbal meds and diet supplements for 7 days before exam. Also to NOT take any form of alcohol, recreational drugs and any forms of ED meds 24 hours before exam.   Misc Orders:       Further instructions given by staff:  I provide prep instructions to patient at the time of the appointment and reviewed date, time and location, he verbalized that he understood and is aware to call if he has any questions.

## 2023-10-17 ENCOUNTER — ANESTHESIA (OUTPATIENT)
Dept: ENDOSCOPY | Facility: HOSPITAL | Age: 38
End: 2023-10-17
Payer: COMMERCIAL

## 2023-10-17 ENCOUNTER — ANESTHESIA EVENT (OUTPATIENT)
Dept: ENDOSCOPY | Facility: HOSPITAL | Age: 38
End: 2023-10-17
Payer: COMMERCIAL

## 2023-10-17 ENCOUNTER — HOSPITAL ENCOUNTER (OUTPATIENT)
Facility: HOSPITAL | Age: 38
Setting detail: HOSPITAL OUTPATIENT SURGERY
Discharge: HOME | End: 2023-10-17
Attending: INTERNAL MEDICINE | Admitting: INTERNAL MEDICINE
Payer: COMMERCIAL

## 2023-10-17 DIAGNOSIS — F32.A ANXIETY AND DEPRESSION: ICD-10-CM

## 2023-10-17 DIAGNOSIS — E55.9 VITAMIN D DEFICIENCY: ICD-10-CM

## 2023-10-17 DIAGNOSIS — J45.40 MODERATE PERSISTENT ASTHMA WITHOUT COMPLICATION: ICD-10-CM

## 2023-10-17 DIAGNOSIS — Z00.00 ANNUAL PHYSICAL EXAM: ICD-10-CM

## 2023-10-17 DIAGNOSIS — M26.609 TMJ (TEMPOROMANDIBULAR JOINT DISORDER): ICD-10-CM

## 2023-10-17 DIAGNOSIS — J45.20 MILD INTERMITTENT ASTHMA, UNSPECIFIED WHETHER COMPLICATED: ICD-10-CM

## 2023-10-17 DIAGNOSIS — K51.919 ULCERATIVE COLITIS WITH COMPLICATION, UNSPECIFIED LOCATION (HCC): ICD-10-CM

## 2023-10-17 DIAGNOSIS — F41.9 ANXIETY AND DEPRESSION: ICD-10-CM

## 2023-10-17 PROBLEM — K63.5 POLYP OF TRANSVERSE COLON: Status: ACTIVE | Noted: 2023-10-17

## 2023-10-17 PROCEDURE — 0DBF8ZX EXCISION OF RIGHT LARGE INTESTINE, VIA NATURAL OR ARTIFICIAL OPENING ENDOSCOPIC, DIAGNOSTIC: ICD-10-PCS | Performed by: INTERNAL MEDICINE

## 2023-10-17 PROCEDURE — 45380 COLONOSCOPY AND BIOPSY: CPT | Performed by: INTERNAL MEDICINE

## 2023-10-17 PROCEDURE — 45385 COLONOSCOPY W/LESION REMOVAL: CPT | Performed by: INTERNAL MEDICINE

## 2023-10-17 PROCEDURE — 0DBL8ZX EXCISION OF TRANSVERSE COLON, VIA NATURAL OR ARTIFICIAL OPENING ENDOSCOPIC, DIAGNOSTIC: ICD-10-PCS | Performed by: INTERNAL MEDICINE

## 2023-10-17 RX ORDER — ALBUTEROL SULFATE 90 UG/1
2 AEROSOL, METERED RESPIRATORY (INHALATION) EVERY 6 HOURS PRN
Qty: 18 G | Refills: 0 | Status: SHIPPED | OUTPATIENT
Start: 2023-10-17

## 2023-10-17 RX ORDER — SODIUM CHLORIDE, SODIUM LACTATE, POTASSIUM CHLORIDE, CALCIUM CHLORIDE 600; 310; 30; 20 MG/100ML; MG/100ML; MG/100ML; MG/100ML
INJECTION, SOLUTION INTRAVENOUS CONTINUOUS
Status: DISCONTINUED | OUTPATIENT
Start: 2023-10-17 | End: 2023-10-17

## 2023-10-17 RX ORDER — FLUTICASONE PROPIONATE AND SALMETEROL 100; 50 UG/1; UG/1
1 POWDER RESPIRATORY (INHALATION) 2 TIMES DAILY
Qty: 14 EACH | Refills: 1 | Status: SHIPPED | OUTPATIENT
Start: 2023-10-17

## 2023-10-17 RX ORDER — LIDOCAINE HYDROCHLORIDE 10 MG/ML
INJECTION, SOLUTION EPIDURAL; INFILTRATION; INTRACAUDAL; PERINEURAL AS NEEDED
Status: DISCONTINUED | OUTPATIENT
Start: 2023-10-17 | End: 2023-10-17 | Stop reason: SURG

## 2023-10-17 RX ORDER — NALOXONE HYDROCHLORIDE 0.4 MG/ML
80 INJECTION, SOLUTION INTRAMUSCULAR; INTRAVENOUS; SUBCUTANEOUS AS NEEDED
Status: DISCONTINUED | OUTPATIENT
Start: 2023-10-17 | End: 2023-10-17

## 2023-10-17 RX ADMIN — SODIUM CHLORIDE, SODIUM LACTATE, POTASSIUM CHLORIDE, CALCIUM CHLORIDE: 600; 310; 30; 20 INJECTION, SOLUTION INTRAVENOUS at 10:39:00

## 2023-10-17 RX ADMIN — LIDOCAINE HYDROCHLORIDE 50 MG: 10 INJECTION, SOLUTION EPIDURAL; INFILTRATION; INTRACAUDAL; PERINEURAL at 10:14:00

## 2023-10-17 RX ADMIN — SODIUM CHLORIDE, SODIUM LACTATE, POTASSIUM CHLORIDE, CALCIUM CHLORIDE: 600; 310; 30; 20 INJECTION, SOLUTION INTRAVENOUS at 10:13:00

## 2023-10-17 NOTE — OPERATIVE REPORT
COLONOSCOPY REPORT    Hillary Mart     6/3/1985 Age 45year old   PCP Mike Garcia MD Endoscopist Renan Méndez MD     Date of procedure: 10/17/23    Procedure: Colonoscopy w/cold snare polypectomy + cold biopsy    Pre-operative diagnosis: Ulcerative colitis    Post-operative diagnosis: Colon polyps x5, internal hemorrhoids    Medications: MAC    Withdrawal time: 15 minutes    Procedure:  Informed consent was obtained from the patient after the risks of the procedure were discussed, including but not limited to bleeding, perforation, aspiration, infection, or possibility of a missed lesion. After discussions of the risks/benefits and alternatives to this procedure, as well as the planned sedation, the patient was placed in the left lateral decubitus position and begun on continuous blood pressure pulse oximetry and EKG monitoring and this was maintained throughout the procedure. Once an adequate level of sedation was obtained a digital rectal exam was completed. Then the lubricated tip of the [id number] diagnostic video colonoscope was inserted and advanced without difficulty to the cecum using the CO2 insufflation technique. The cecum was identified by localizing the trifold, the appendix and the ileocecal valve. Withdrawal was begun with thorough washing and careful examination of the colonic walls and folds. A routine second examination of the cecum/ascending colon was performed. Photodocumentation was obtained. The bowel prep was good. Views of the colon were good with washing. I then carefully withdrew the instrument from the patient who tolerated the procedure well. Complications: none. Findings:   1. Five polyp(s) noted as follows:      A. All noted in the transverse colon, all flat, ranging in size from 6-9mm s/p cold snare polypectomy and retrieved. 2. Diverticulosis: none.     3. Terminal ileum: the visualized mucosa appeared normal.    4. The colonic mucosa throughout the colon showed normal vascular pattern, without evidence of angioectasias or inflammation. Biopsies taken throughout the colon with cold biopsy for surveillance. 5. A retroflexed view of the rectum revealed small internal hemorrhoids. 6. HENRY: normal rectal tone, no masses palpated. Impression:   Five small colon polyps removed. Small internal hemorrhoids. Surveillance biopsies performed for hx of UC, no active endoscopic colitis (currently on mesalamine). Recommend:  Await pathology. The interval for the next colonoscopy will be determined after reviewing pathology - likely 1-2 years. If new signs or symptoms develop, colonoscopy may need to be repeated sooner. High fiber diet. Monitor for blood in the stool. If having more than just tinge of blood, call office or go to the ER.    >>>If tissue was obtained and you have not received your pathology results either by phone or letter within 2 weeks, please call our office at [phone].     Specimens: colon    Blood loss: <1 ml

## 2023-10-17 NOTE — TELEPHONE ENCOUNTER
Future Appointment:5/24      Last Appointment:5/6/23      Last Refill:2/27/23      Medication Requested:   Requested Prescriptions     Signed Prescriptions Disp Refills    fluticasone-salmeterol 100-50 MCG/ACT Inhalation Aerosol Powder, Breath Activated 14 each 1     Sig: Inhale 1 puff into the lungs 2 (two) times daily. Authorizing Provider: HALIE Villalba     Ordering User: INDIGO GONZALEZ    albuterol 108 (90 Base) MCG/ACT Inhalation Aero Soln 18 g 0     Sig: Inhale 2 puffs into the lungs every 6 (six) hours as needed for Wheezing.      Authorizing Provider: Red Grain     Ordering User: Constantin Allen

## 2023-10-17 NOTE — DISCHARGE INSTRUCTIONS
Home Care Instructions for Colonoscopy with Sedation    Diet:  - Resume your regular diet as tolerated unless otherwise instructed. - Start with light meals to minimize bloating.  - Do not drink alcohol today. Medication:  - If you have questions about resuming your normal medications, please contact your Primary Care Physician. Activities:  - Take it easy today. Do not return to work today. - Do not drive today. - Do not operate any machinery today (including kitchen equipment). Colonoscopy:  - You may notice some rectal \"spotting\" (a little blood on the toilet tissue) for a day or two after the exam. This is normal.  - If you experience any rectal bleeding (not spotting), persistent tenderness or sharp severe abdominal pains, oral temperature over 100 degrees Fahrenheit, light-headedness or dizziness, or any other problems, contact your doctor. Gastroscopy:  - You may have a sore throat for 2-3 days following the exam. This is normal. Gargling with warm salt water (1/2 tsp salt to 1 glass warm water) or using throat lozenges will help. - If you experience any sharp pain in your neck, abdomen or chest, vomiting of blood, oral temperature over 100 degrees Fahrenheit, light-headedness or dizziness, or any other problems, contact your doctor. **If unable to reach your doctor, please go to the [facility] Emergency Room**    - Your referring physician will receive a full report of your examination.  - If you do not hear from your doctor's office within two weeks of your biopsy, please call them for your results. You may be able to see your laboratory results in [facility] between 4 and 7 business days. In some cases, your physician may not have viewed the results before they are released to [address] If you have questions regarding your results contact the physician who ordered the test/exam by phone or via [address] by choosing \"Ask a Medical Question. \"

## 2023-10-17 NOTE — ANESTHESIA POSTPROCEDURE EVALUATION
Patient: Selina Asif    Procedure Summary       Date: 10/17/23 Room / Location: [facility] ENDOSCOPY 01 / [facility] ENDOSCOPY    Anesthesia Start: 7646 Anesthesia Stop: 4332    Procedure: COLONOSCOPY Diagnosis:       Ulcerative colitis with complication, unspecified location ([address]      (colon polyps, hemorrhoids)    Surgeons: Nini Gilbert MD Anesthesiologist: Dutch Mariscal MD    Anesthesia Type: MAC ASA Status: 2            Anesthesia Type: MAC    Vitals Value Taken Time   /63 10/17/23 1042   Temp  10/17/23 1043   Pulse 78 10/17/23 1043   Resp 17 10/17/23 1043   SpO2 93 % 10/17/23 1043   Vitals shown include unfiled device data.     [facility] AN Post Evaluation:   Patient Evaluated in PACU  Patient Participation: complete - patient participated  Level of Consciousness: awake  Pain Score: 0  Pain Management: adequate  Airway Patency:patent  Dental exam unchanged from preop  Yes    Cardiovascular Status: acceptable  Respiratory Status: acceptable  Postoperative Hydration acceptable      Darryn So MD  10/17/2023 10:43 AM

## 2023-10-17 NOTE — H&P
History & Physical Examination    Patient Name: Marie Schaumann  MRN: [id number]  CSN: [id number]  YOB: 1985    Diagnosis: ulcerative colitis    fluticasone-salmeterol 100-50 MCG/ACT Inhalation Aerosol Powder, Breath Activated, Inhale 1 puff into the lungs 2 (two) times daily. , Disp: 14 each, Rfl: 1  albuterol 108 (90 Base) MCG/ACT Inhalation Aero Soln, Inhale 2 puffs into the lungs every 6 (six) hours as needed for Wheezing., Disp: 18 g, Rfl: 0, 10/17/2023  mesalamine 1.2 g Oral Tab EC, Take 2 tablets (2.4 g total) by mouth daily. Take every day with a meal, Disp: 180 tablet, Rfl: 1, 10/15/2023  fluticasone-salmeterol (ADVAIR DISKUS) 250-50 MCG/ACT Inhalation Aerosol Powder, Breath Activated, Inhale 1 puff into the lungs every 12 (twelve) hours. , Disp: 14 each, Rfl: 0, 10/3/2023  CELECOXIB 200 MG Oral Cap, TAKE ONE CAPSULE (200 MG TOTAL) BY MOUTH DAILY AS NEEDED FOR PAIN (Patient not taking: Reported on 5/6/2023), Disp: 30 capsule, Rfl: 0  cyclobenzaprine 5 MG Oral Tab, Take 1 tablet (5 mg total) by mouth nightly. (Patient not taking: Reported on 5/6/2023), Disp: 30 tablet, Rfl: 1  cyclobenzaprine 10 MG Oral Tab, Take 1 tablet (10 mg total) by mouth 2 (two) times daily as needed for Muscle spasms.  (Patient not taking: Reported on 5/6/2023), Disp: 20 tablet, Rfl: 1      lactated ringers infusion, , Intravenous, Continuous        Allergies: No Known Allergies    Past Medical History:   Diagnosis Date    Asthma     Colitis     Ulcerative colitis ([facility] [address] 2009     Past Surgical History:   Procedure Laterality Date    COLONOSCOPY  09/01/2016    repeat in 3 years     ELBOW SURGERY       Family History   Problem Relation Age of Onset    Hypertension Father     Cancer Mother     Pulmonary Disease Brother      Social History    Tobacco Use      Smoking status: Never      Smokeless tobacco: Never    Alcohol use: Yes      Comment: a beer every now and then         SYSTEM Check if Review is Normal Check if Physical Exam is Normal If not normal, please explain:   JESUS Crispian.Living ] [ Juan Arevalo  Crispian.Living ] [ Brown Rangel Crispian.Living ] [ Debora Smith Crispian.Living ] [ Steward Phalen Crispian.Living ] [ Deric Gilmore Crispian.Living ] [ X]    OTHER        I have discussed the risks and benefits and alternatives of the procedure with the patient/family. They understand and agree to proceed with plan of care. I have reviewed the History and Physical done within the last 30 days. Any changes noted above.     Thi Solano, [address] [place] [address] - Gastroenterology  10/17/2023  10:17 AM

## 2023-10-18 VITALS
DIASTOLIC BLOOD PRESSURE: 84 MMHG | HEART RATE: 63 BPM | BODY MASS INDEX: 36.37 KG/M2 | WEIGHT: 240 LBS | SYSTOLIC BLOOD PRESSURE: 123 MMHG | OXYGEN SATURATION: 98 % | HEIGHT: 68 IN | RESPIRATION RATE: 11 BRPM

## 2023-11-03 ENCOUNTER — TELEPHONE (OUTPATIENT)
Facility: CLINIC | Age: 38
End: 2023-11-03

## 2023-11-03 NOTE — TELEPHONE ENCOUNTER
I mailed out colonoscopy results letter to pt  Updated health maintenance  Entered into 2 yr CLN recall  Recall colon in 2 years per.  Colon done 10/17/2023    Jennifer Hoff MD  [facility] Clinical Staff  Recall CLN in 2 years

## 2023-11-28 ENCOUNTER — PATIENT MESSAGE (OUTPATIENT)
Dept: INTERNAL MEDICINE CLINIC | Facility: CLINIC | Age: 38
End: 2023-11-28

## 2023-11-28 RX ORDER — SERTRALINE HYDROCHLORIDE 100 MG/1
100 TABLET, FILM COATED ORAL DAILY
Qty: 90 TABLET | Refills: 1 | Status: SHIPPED | OUTPATIENT
Start: 2023-11-28 | End: 2024-02-26

## 2023-11-28 NOTE — TELEPHONE ENCOUNTER
Eboni Sandoval, [place] 11/28/2023 9:22 AM CST    Please advise   ----- Message -----  From: Harriet Wasserman  Sent: 11/28/2023 7:39 AM CST  To: Reyna Cordova Clinical Staff  Subject: Medication sertraline     Hi Dr Kelly Hogan  I was trying to get a refill on my sertraline medication but I can't find it on my medication tab. Could you send a prescription refill to the pharmacy. I am out of refills and medication. If you need me to set up an appointment I will but my physical is not due until next year according to my records. Thank you for your time.

## 2023-12-26 RX ORDER — MESALAMINE 1.2 G/1
2.4 TABLET, DELAYED RELEASE ORAL DAILY
Qty: 180 TABLET | Refills: 0 | Status: SHIPPED | OUTPATIENT
Start: 2023-12-26 | End: 2024-06-23

## 2023-12-26 NOTE — TELEPHONE ENCOUNTER
Requested Prescriptions     Pending Prescriptions Disp Refills    MESALAMINE 1.2 g Oral Tab EC [Pharmacy Med Name: Mesalamine Dr 1.2 Gm Tab Nort] 180 tablet 0     Sig: Take 2 tablets (2.4 g total) by mouth daily.  Take every day with a meal     LOV: 6/22/23  Last Refiil: 06/22/23

## 2024-01-04 ENCOUNTER — OFFICE VISIT (OUTPATIENT)
Dept: FAMILY MEDICINE CLINIC | Facility: CLINIC | Age: 39
End: 2024-01-04
Payer: COMMERCIAL

## 2024-01-04 VITALS
SYSTOLIC BLOOD PRESSURE: 118 MMHG | BODY MASS INDEX: 36.37 KG/M2 | WEIGHT: 240 LBS | HEIGHT: 68 IN | RESPIRATION RATE: 16 BRPM | TEMPERATURE: 98 F | OXYGEN SATURATION: 97 % | HEART RATE: 92 BPM | DIASTOLIC BLOOD PRESSURE: 88 MMHG

## 2024-01-04 DIAGNOSIS — R09.A2 GLOBUS SENSATION: Primary | ICD-10-CM

## 2024-01-04 LAB
CONTROL LINE PRESENT WITH A CLEAR BACKGROUND (YES/NO): YES YES/NO
KIT LOT #: NORMAL NUMERIC
STREP GRP A CUL-SCR: NEGATIVE

## 2024-01-04 PROCEDURE — 99213 OFFICE O/P EST LOW 20 MIN: CPT | Performed by: NURSE PRACTITIONER

## 2024-01-04 PROCEDURE — 87880 STREP A ASSAY W/OPTIC: CPT | Performed by: NURSE PRACTITIONER

## 2024-01-04 PROCEDURE — 87081 CULTURE SCREEN ONLY: CPT | Performed by: NURSE PRACTITIONER

## 2024-01-04 PROCEDURE — 3074F SYST BP LT 130 MM HG: CPT | Performed by: NURSE PRACTITIONER

## 2024-01-04 PROCEDURE — 3008F BODY MASS INDEX DOCD: CPT | Performed by: NURSE PRACTITIONER

## 2024-01-04 PROCEDURE — 3079F DIAST BP 80-89 MM HG: CPT | Performed by: NURSE PRACTITIONER

## 2024-01-04 NOTE — PROGRESS NOTES
CHIEF COMPLAINT:     Chief Complaint   Patient presents with    Throat Problem     Woke up this morning trouble swallowing, gagging d/t feeling of something stuck in throat. Denies pain but feels like a lump to throat   Denies other sx          HPI:   Ruben Peck is a 38 year old male presents to clinic with complaint of sensation that something is stuck in throat. Patient has had since waking up this morning. Symptoms have mild and intermittent since onset.  Patient reports following associated symptoms: makes him gag, worse when lying down.  States still able to drink fluids and breath without difficulty.  Denies nasal congestion, cough, fever, rash, nausea, headache, ear pain.  Treating symptoms with warm liquids.  Has no history of strep throat. No sick contacts at home.        Current Outpatient Medications   Medication Sig Dispense Refill    mesalamine 1.2 g Oral Tab EC Take 2 tablets (2.4 g total) by mouth daily. Take every day with a meal 180 tablet 0    sertraline 100 MG Oral Tab Take 1 tablet (100 mg total) by mouth daily. 90 tablet 1    albuterol 108 (90 Base) MCG/ACT Inhalation Aero Soln Inhale 2 puffs into the lungs every 6 (six) hours as needed for Wheezing. 18 g 0    fluticasone-salmeterol (ADVAIR DISKUS) 250-50 MCG/ACT Inhalation Aerosol Powder, Breath Activated Inhale 1 puff into the lungs every 12 (twelve) hours. 14 each 0      Past Medical History:   Diagnosis Date    Asthma     Colitis     Ulcerative colitis (HCC) 2009    repeat CLN in 2025      Social History:  Social History     Socioeconomic History    Marital status:    Tobacco Use    Smoking status: Never    Smokeless tobacco: Never   Vaping Use    Vaping Use: Never used   Substance and Sexual Activity    Alcohol use: Yes     Comment: a beer every now and then     Drug use: Never        REVIEW OF SYSTEMS:   GENERAL HEALTH: feels well otherwise, normal appetite but has not eaten yet.  SKIN: denies any unusual skin lesions or  rashes  HEENT: denies ear pain, See HPI  RESPIRATORY: denies shortness of breath or wheezing  CARDIOVASCULAR: denies chest pain or palpitations   GI: denies vomiting or diarrhea  NEURO: denies dizziness or lightheadedness    EXAM:   /88   Pulse 92   Temp 97.8 °F (36.6 °C)   Resp 16   Ht 5' 8\" (1.727 m)   Wt 240 lb (108.9 kg)   SpO2 97%   BMI 36.49 kg/m²   GENERAL: well developed, well nourished,in no apparent distress  SKIN: no rashes,no suspicious lesions  HEAD: atraumatic, normocephalic  EYES: conjunctiva clear, EOM intact  EARS: TM's clear, non-injected, no bulging, retraction, or fluid bilaterally  NOSE: nostrils patent, no exudates, nasal mucosa pink and noninflamed  THROAT: oral mucosa pink, moist. Posterior pharynx erythematous and injected. Uvula inflamed.  No exudates. Tonsils 1/4.  Breath is not malodorous.  No trismus, hoarseness, muffled voice, stridor, drooling or uvular deviation.    NECK: supple, non-tender  LUNGS: clear to auscultation bilaterally, no wheezes or rhonchi. Breathing is non labored.  CARDIO: RRR without murmur  LYMPH: No anterior cervical. no submandibular LAD.  No posterior cervical or occipital LAD.  NEURO:  No focal deficits    Recent Results (from the past 24 hour(s))   Rapid Strep    Collection Time: 01/04/24 10:42 AM   Result Value Ref Range    Strep Grp A Screen Negative Negative    Control Line Present with a clear background (yes/no) Yes Yes/No    Kit Lot # [id number] Numeric    Kit Expiration Date 4/22/25 Date         ASSESSMENT AND PLAN:     ASSESSMENT:  Encounter Diagnosis   Name Primary?    Globus sensation Yes       PLAN:   Comfort care as listed in patient instructions.   Medication as below.    Requested Prescriptions      No prescriptions requested or ordered in this encounter       Risks, benefits, side effects of medication explained and discussed.     Discussed that due to symptoms and negative rapid strep this is most likely viral and does not require  antibiotics.     Will send throat culture.    Follow up in 3-5 days if not improving, condition worsens, or fever greater than or equal to 100.4 persists for 72 hours.      The patient/parent indicates understanding of these issues and agrees to the plan.      Patient Instructions     If we send out a throat culture, we will contact you with the results in 48-72 hours. If positive, then we will call in an appropriate antibiotic. If negative, then the sore throat is most likely viral in origin and should resolve within 7-10 days.     Comfort measures explained and discussed:    OTC Tylenol/ibuprofen as needed.    Push fluids- warm or cool liquids, whichever is soothing for patient.     Avoid caffeine.    Do not share utensils or drinks with anyone.    Good handwashing.    Get plenty of rest.    Can use over the counter benzocaine such as Cepacol throat lozenges or Chloroseptic throat spray to soothe sore throat.    Warm salt water gargles 2-3 times daily for at least 3 days.      If strep test is results are positive:    Take the full course of your antibiotic even if you are feeling better.   You are considered to be contagious until you have been on antibiotics for 24 hours.   You can return to school and/or work once on antibiotics for 24 hours  Change tooth brush two days into therapy  Follow up in 3-5 days if not improving, condition worsens, or fever greater than or equal to 100.4 persists for 72 hours.  Follow up in 3-5 days if not improving, condition worsens, or fever greater than or equal to 100.4 persists for 72 hours.

## 2024-01-04 NOTE — PATIENT INSTRUCTIONS
If we send out a throat culture, we will contact you with the results in 48-72 hours. If positive, then we will call in an appropriate antibiotic. If negative, then the sore throat is most likely viral in origin and should resolve within 7-10 days.     Comfort measures explained and discussed:    OTC Tylenol/ibuprofen as needed.    Push fluids- warm or cool liquids, whichever is soothing for patient.     Avoid caffeine.    Do not share utensils or drinks with anyone.    Good handwashing.    Get plenty of rest.    Can use over the counter benzocaine such as Cepacol throat lozenges or Chloroseptic throat spray to soothe sore throat.    Warm salt water gargles 2-3 times daily for at least 3 days.      If strep test is results are positive:    Take the full course of your antibiotic even if you are feeling better.   You are considered to be contagious until you have been on antibiotics for 24 hours.   You can return to school and/or work once on antibiotics for 24 hours  Change tooth brush two days into therapy  Follow up in 3-5 days if not improving, condition worsens, or fever greater than or equal to 100.4 persists for 72 hours.  Follow up in 3-5 days if not improving, condition worsens, or fever greater than or equal to 100.4 persists for 72 hours.

## 2024-01-08 DIAGNOSIS — Z00.00 ANNUAL PHYSICAL EXAM: ICD-10-CM

## 2024-01-08 DIAGNOSIS — F41.9 ANXIETY AND DEPRESSION: ICD-10-CM

## 2024-01-08 DIAGNOSIS — E55.9 VITAMIN D DEFICIENCY: ICD-10-CM

## 2024-01-08 DIAGNOSIS — J45.40 MODERATE PERSISTENT ASTHMA WITHOUT COMPLICATION: ICD-10-CM

## 2024-01-08 DIAGNOSIS — F32.A ANXIETY AND DEPRESSION: ICD-10-CM

## 2024-01-08 DIAGNOSIS — M26.609 TMJ (TEMPOROMANDIBULAR JOINT DISORDER): ICD-10-CM

## 2024-01-08 RX ORDER — ALBUTEROL SULFATE 90 UG/1
2 AEROSOL, METERED RESPIRATORY (INHALATION) EVERY 6 HOURS PRN
Qty: 18 G | Refills: 0 | Status: SHIPPED | OUTPATIENT
Start: 2024-01-08

## 2024-01-08 RX ORDER — FLUTICASONE PROPIONATE AND SALMETEROL 250; 50 UG/1; UG/1
1 POWDER RESPIRATORY (INHALATION) EVERY 12 HOURS SCHEDULED
Qty: 14 EACH | Refills: 0 | Status: SHIPPED | OUTPATIENT
Start: 2024-01-08

## 2024-01-08 NOTE — TELEPHONE ENCOUNTER
Future Appointment:None      Last Appointment:5/6/23      Last Refill:10/17/23      Medication Requested:   Requested Prescriptions     Pending Prescriptions Disp Refills    fluticasone-salmeterol (ADVAIR DISKUS) 250-50 MCG/ACT Inhalation Aerosol Powder, Breath Activated 14 each 0     Sig: Inhale 1 puff into the lungs every 12 (twelve) hours.    albuterol 108 (90 Base) MCG/ACT Inhalation Aero Soln 18 g 0     Sig: Inhale 2 puffs into the lungs every 6 (six) hours as needed for Wheezing.       Protocol :       Asthma & COPD Medication Protocol [id number]/08/2024 08:54 AM    Asthma Action Score greater than or equal to 20    Appointment in past 6 or next 3 months    AAP/ACT given in last 12 months

## 2024-04-04 RX ORDER — MESALAMINE 1.2 G/1
2.4 TABLET, DELAYED RELEASE ORAL DAILY
Qty: 180 TABLET | Refills: 0 | Status: SHIPPED | OUTPATIENT
Start: 2024-04-04 | End: 2024-10-01

## 2024-04-04 NOTE — TELEPHONE ENCOUNTER
Dr. Santacruz,    Refill request received for mesalamine. Last seen 10/17/23 for procedure. Last refill 12/26/23

## 2024-04-04 NOTE — TELEPHONE ENCOUNTER
Current Outpatient Medications   Medication Sig Dispense Refill    mesalamine 1.2 g Oral Tab EC Take 2 tablets (2.4 g total) by mouth daily. Take every day with a meal 180 tablet 0

## 2024-05-27 DIAGNOSIS — E55.9 VITAMIN D DEFICIENCY: ICD-10-CM

## 2024-05-27 DIAGNOSIS — M26.609 TMJ (TEMPOROMANDIBULAR JOINT DISORDER): ICD-10-CM

## 2024-05-27 DIAGNOSIS — J45.40 MODERATE PERSISTENT ASTHMA WITHOUT COMPLICATION (HCC): ICD-10-CM

## 2024-05-27 DIAGNOSIS — F32.A ANXIETY AND DEPRESSION: ICD-10-CM

## 2024-05-27 DIAGNOSIS — F41.9 ANXIETY AND DEPRESSION: ICD-10-CM

## 2024-05-27 DIAGNOSIS — Z00.00 ANNUAL PHYSICAL EXAM: ICD-10-CM

## 2024-05-28 RX ORDER — ALBUTEROL SULFATE 90 UG/1
2 AEROSOL, METERED RESPIRATORY (INHALATION) EVERY 6 HOURS PRN
Qty: 18 G | Refills: 0 | OUTPATIENT
Start: 2024-05-28

## 2024-05-28 RX ORDER — FLUTICASONE PROPIONATE AND SALMETEROL 250; 50 UG/1; UG/1
1 POWDER RESPIRATORY (INHALATION) EVERY 12 HOURS SCHEDULED
Qty: 14 EACH | Refills: 0 | OUTPATIENT
Start: 2024-05-28

## 2024-06-30 DIAGNOSIS — F32.A ANXIETY AND DEPRESSION: ICD-10-CM

## 2024-06-30 DIAGNOSIS — Z00.00 ANNUAL PHYSICAL EXAM: ICD-10-CM

## 2024-06-30 DIAGNOSIS — M26.609 TMJ (TEMPOROMANDIBULAR JOINT DISORDER): ICD-10-CM

## 2024-06-30 DIAGNOSIS — J45.40 MODERATE PERSISTENT ASTHMA WITHOUT COMPLICATION (HCC): ICD-10-CM

## 2024-06-30 DIAGNOSIS — F41.9 ANXIETY AND DEPRESSION: ICD-10-CM

## 2024-06-30 DIAGNOSIS — E55.9 VITAMIN D DEFICIENCY: ICD-10-CM

## 2024-07-01 RX ORDER — FLUTICASONE PROPIONATE AND SALMETEROL 250; 50 UG/1; UG/1
1 POWDER RESPIRATORY (INHALATION) EVERY 12 HOURS SCHEDULED
Qty: 14 EACH | Refills: 0 | OUTPATIENT
Start: 2024-07-01

## 2024-07-01 RX ORDER — ALBUTEROL SULFATE 90 UG/1
2 AEROSOL, METERED RESPIRATORY (INHALATION) EVERY 6 HOURS PRN
Qty: 18 G | Refills: 0 | OUTPATIENT
Start: 2024-07-01

## 2024-09-02 DIAGNOSIS — F32.A ANXIETY AND DEPRESSION: ICD-10-CM

## 2024-09-02 DIAGNOSIS — M26.609 TMJ (TEMPOROMANDIBULAR JOINT DISORDER): ICD-10-CM

## 2024-09-02 DIAGNOSIS — J45.40 MODERATE PERSISTENT ASTHMA WITHOUT COMPLICATION (HCC): ICD-10-CM

## 2024-09-02 DIAGNOSIS — Z00.00 ANNUAL PHYSICAL EXAM: ICD-10-CM

## 2024-09-02 DIAGNOSIS — E55.9 VITAMIN D DEFICIENCY: ICD-10-CM

## 2024-09-02 DIAGNOSIS — F41.9 ANXIETY AND DEPRESSION: ICD-10-CM

## 2024-09-03 ENCOUNTER — PATIENT MESSAGE (OUTPATIENT)
Dept: INTERNAL MEDICINE CLINIC | Facility: CLINIC | Age: 39
End: 2024-09-03

## 2024-09-03 RX ORDER — FLUTICASONE PROPIONATE AND SALMETEROL 250; 50 UG/1; UG/1
1 POWDER RESPIRATORY (INHALATION) EVERY 12 HOURS SCHEDULED
Qty: 14 EACH | Refills: 0 | OUTPATIENT
Start: 2024-09-03

## 2024-09-03 RX ORDER — ALBUTEROL SULFATE 90 UG/1
2 AEROSOL, METERED RESPIRATORY (INHALATION) EVERY 6 HOURS PRN
Qty: 18 G | Refills: 0 | OUTPATIENT
Start: 2024-09-03

## 2024-09-04 RX ORDER — ALBUTEROL SULFATE 90 UG/1
2 AEROSOL, METERED RESPIRATORY (INHALATION) EVERY 6 HOURS PRN
Qty: 6.7 G | Refills: 0 | Status: SHIPPED | OUTPATIENT
Start: 2024-09-04

## 2024-09-04 NOTE — TELEPHONE ENCOUNTER
Alma aHrris MA 9/3/2024 9:01 AM CDT    Patients last office visit was 5/2023  ----- Message -----  From: Ruben Peck  Sent: 9/3/2024 8:44 AM CDT  To: Reyna Cordova Clinical Staff  Subject: request of medication denial     I have been trying to get my medication refilled at the [facility] in my town and I keep getting a denial from MyChart Administration with a cc to someone named Luanne. I am running out of medicine and with cold and flu season coming up I know I will be needing my rescue inhaler.

## 2024-10-07 ENCOUNTER — OFFICE VISIT (OUTPATIENT)
Dept: INTERNAL MEDICINE CLINIC | Facility: CLINIC | Age: 39
End: 2024-10-07
Payer: COMMERCIAL

## 2024-10-07 VITALS
DIASTOLIC BLOOD PRESSURE: 80 MMHG | HEIGHT: 68 IN | SYSTOLIC BLOOD PRESSURE: 128 MMHG | WEIGHT: 245.19 LBS | TEMPERATURE: 97 F | OXYGEN SATURATION: 97 % | HEART RATE: 82 BPM | BODY MASS INDEX: 37.16 KG/M2

## 2024-10-07 DIAGNOSIS — Z13.0 SCREENING FOR DEFICIENCY ANEMIA: ICD-10-CM

## 2024-10-07 DIAGNOSIS — Z13.21 ENCOUNTER FOR VITAMIN DEFICIENCY SCREENING: ICD-10-CM

## 2024-10-07 DIAGNOSIS — Z13.220 LIPID SCREENING: ICD-10-CM

## 2024-10-07 DIAGNOSIS — Z13.29 THYROID DISORDER SCREEN: ICD-10-CM

## 2024-10-07 DIAGNOSIS — R73.09 ELEVATED GLUCOSE: ICD-10-CM

## 2024-10-07 DIAGNOSIS — Z00.00 WELLNESS EXAMINATION: ICD-10-CM

## 2024-10-07 DIAGNOSIS — Z30.09 VASECTOMY EVALUATION: Primary | ICD-10-CM

## 2024-10-07 LAB
ALBUMIN SERPL-MCNC: 4.8 G/DL (ref 3.2–4.8)
ALBUMIN/GLOB SERPL: 1.4 {RATIO} (ref 1–2)
ALP LIVER SERPL-CCNC: 88 U/L
ALT SERPL-CCNC: 34 U/L
ANION GAP SERPL CALC-SCNC: 7 MMOL/L (ref 0–18)
AST SERPL-CCNC: 25 U/L (ref ?–34)
BASOPHILS # BLD AUTO: 0.06 X10(3) UL (ref 0–0.2)
BASOPHILS NFR BLD AUTO: 0.6 %
BILIRUB SERPL-MCNC: 0.6 MG/DL (ref 0.3–1.2)
BUN BLD-MCNC: 9 MG/DL (ref 9–23)
BUN/CREAT SERPL: 7.9 (ref 10–20)
CALCIUM BLD-MCNC: 9.7 MG/DL (ref 8.7–10.4)
CHLORIDE SERPL-SCNC: 106 MMOL/L (ref 98–112)
CHOLEST SERPL-MCNC: 200 MG/DL (ref ?–200)
CO2 SERPL-SCNC: 28 MMOL/L (ref 21–32)
CREAT BLD-MCNC: 1.14 MG/DL
DEPRECATED RDW RBC AUTO: 40.9 FL (ref 35.1–46.3)
EGFRCR SERPLBLD CKD-EPI 2021: 84 ML/MIN/1.73M2 (ref 60–?)
EOSINOPHIL # BLD AUTO: 0.47 X10(3) UL (ref 0–0.7)
EOSINOPHIL NFR BLD AUTO: 5 %
ERYTHROCYTE [DISTWIDTH] IN BLOOD BY AUTOMATED COUNT: 13.2 % (ref 11–15)
EST. AVERAGE GLUCOSE BLD GHB EST-MCNC: 123 MG/DL (ref 68–126)
FASTING PATIENT LIPID ANSWER: YES
FASTING STATUS PATIENT QL REPORTED: YES
GLOBULIN PLAS-MCNC: 3.4 G/DL (ref 2–3.5)
GLUCOSE BLD-MCNC: 97 MG/DL (ref 70–99)
HBA1C MFR BLD: 5.9 % (ref ?–5.7)
HCT VFR BLD AUTO: 50.4 %
HDLC SERPL-MCNC: 45 MG/DL (ref 40–59)
HGB BLD-MCNC: 17.1 G/DL
IMM GRANULOCYTES # BLD AUTO: 0.05 X10(3) UL (ref 0–1)
IMM GRANULOCYTES NFR BLD: 0.5 %
LDLC SERPL CALC-MCNC: 127 MG/DL (ref ?–100)
LYMPHOCYTES # BLD AUTO: 2.31 X10(3) UL (ref 1–4)
LYMPHOCYTES NFR BLD AUTO: 24.7 %
MCH RBC QN AUTO: 28.7 PG (ref 26–34)
MCHC RBC AUTO-ENTMCNC: 33.9 G/DL (ref 31–37)
MCV RBC AUTO: 84.6 FL
MONOCYTES # BLD AUTO: 0.59 X10(3) UL (ref 0.1–1)
MONOCYTES NFR BLD AUTO: 6.3 %
NEUTROPHILS # BLD AUTO: 5.86 X10 (3) UL (ref 1.5–7.7)
NEUTROPHILS # BLD AUTO: 5.86 X10(3) UL (ref 1.5–7.7)
NEUTROPHILS NFR BLD AUTO: 62.9 %
NONHDLC SERPL-MCNC: 155 MG/DL (ref ?–130)
OSMOLALITY SERPL CALC.SUM OF ELEC: 291 MOSM/KG (ref 275–295)
PLATELET # BLD AUTO: 303 10(3)UL (ref 150–450)
POTASSIUM SERPL-SCNC: 4.3 MMOL/L (ref 3.5–5.1)
PROT SERPL-MCNC: 8.2 G/DL (ref 5.7–8.2)
RBC # BLD AUTO: 5.96 X10(6)UL
SODIUM SERPL-SCNC: 141 MMOL/L (ref 136–145)
TRIGL SERPL-MCNC: 156 MG/DL (ref 30–149)
TSI SER-ACNC: 3.73 MIU/ML (ref 0.55–4.78)
VIT D+METAB SERPL-MCNC: 18 NG/ML (ref 30–100)
VLDLC SERPL CALC-MCNC: 28 MG/DL (ref 0–30)
WBC # BLD AUTO: 9.3 X10(3) UL (ref 4–11)

## 2024-10-07 PROCEDURE — 80050 GENERAL HEALTH PANEL: CPT

## 2024-10-07 PROCEDURE — 80061 LIPID PANEL: CPT

## 2024-10-07 PROCEDURE — 83036 HEMOGLOBIN GLYCOSYLATED A1C: CPT

## 2024-10-07 PROCEDURE — 82306 VITAMIN D 25 HYDROXY: CPT

## 2024-10-07 RX ORDER — FLUTICASONE PROPIONATE AND SALMETEROL XINAFOATE 115; 21 UG/1; UG/1
AEROSOL, METERED RESPIRATORY (INHALATION)
Qty: 1 EACH | Refills: 2 | Status: SHIPPED | OUTPATIENT
Start: 2024-10-07

## 2024-10-07 RX ORDER — SERTRALINE HYDROCHLORIDE 100 MG/1
TABLET, FILM COATED ORAL
COMMUNITY
Start: 2024-10-01 | End: 2024-10-07

## 2024-10-07 RX ORDER — ALBUTEROL SULFATE 90 UG/1
2 INHALANT RESPIRATORY (INHALATION) EVERY 6 HOURS PRN
Qty: 6.7 G | Refills: 0 | Status: SHIPPED | OUTPATIENT
Start: 2024-10-07

## 2024-10-07 RX ORDER — SERTRALINE HYDROCHLORIDE 100 MG/1
100 TABLET, FILM COATED ORAL DAILY
Qty: 90 TABLET | Refills: 3 | Status: SHIPPED | OUTPATIENT
Start: 2024-10-07

## 2024-10-07 NOTE — PATIENT INSTRUCTIONS
Set up your wellness/annual eye exam and get dental cleanings every 6 months.    Your labs have come back and show that you prediabetic.  This means if you are diet and lifestyle do not change, you will become a diabetic within the next year or so.  Now is a good time to make changes, to reverse that trend.  I strongly encourage not eating within 3 hours of bedtime and try to decrease snacks such as chips & crackers, and less rice and potatoes.  Take note of what a serving size is, as you begin to read more labels.  Related to this is your lipid panel which shows elevated triglycerides.  This is the calories being stored as fat or in your liver, instead of used in the body.  The body uses this along with cholesterol to lay plaque down in your arteries.  The LDL or \"bad\" cholesterol is a low density lipid or a fluffy cholesterol.  This is more sticky or adherent to the arteries.    We first look at trying to eat 1/3 less of portion size, eating leaner meats and lower fats such as fried foods or fatty meats and processed, premade foods.  Your vitamin D level is quite low and has been for more than 6 years.  I will send in a prescription for Vitamin D to the [facility] in [place]. Take this for 8 weeks and then transition to a lower dose one, over-the-counter/OTC Vitamin D 2000iu/50mcg, taken daily with meal. A low vitamin D will lower your immunity and plays into strong bones and muscles.  You thyroid, kidney and liver function are normal.  Follow up with Dr. Jacome in 3-4 months for a recheck. Make the appointment ASAP

## 2024-10-07 NOTE — PROGRESS NOTES
Ruben Peck is a 39 year old male who presents for a complete physical exam.   HPI:     Vasectomy referral requested    Exercise:no regular exercise, but active at work    Wt Readings from Last 6 Encounters:   10/07/24 245 lb 3.2 oz (111.2 kg)   01/04/24 240 lb (108.9 kg)   10/11/23 240 lb (108.9 kg)   06/22/23 245 lb (111.1 kg)   05/06/23 241 lb 12.8 oz (109.7 kg)   02/16/23 233 lb (105.7 kg)     Body mass index is 37.28 kg/m².   Contraception:  Cholesterol, Total (mg/dL)   Date Value   04/18/2022 209 (H)   05/10/2021 215 (H)   01/10/2018 171     HDL Cholesterol (mg/dL)   Date Value   04/18/2022 50   05/10/2021 54   01/10/2018 52     LDL Cholesterol (mg/dL)   Date Value   04/18/2022 139 (H)   05/10/2021 132 (H)   01/10/2018 107 (H)     AST (U/L)   Date Value   02/17/2023 36   04/18/2022 22   05/10/2021 22     ALT (U/L)   Date Value   02/17/2023 73 (H)   04/18/2022 46   05/10/2021 45      Current Outpatient Medications   Medication Sig Dispense Refill    fluticasone-salmeterol 115-21 MCG/ACT Inhalation Aerosol One inhalation every 12 hours 1 each 2    albuterol 108 (90 Base) MCG/ACT Inhalation Aero Soln Inhale 2 puffs into the lungs every 6 (six) hours as needed. 6.7 g 0    sertraline 100 MG Oral Tab Take 1 tablet (100 mg total) by mouth daily. 90 tablet 3    albuterol 108 (90 Base) MCG/ACT Inhalation Aero Soln Inhale 2 puffs into the lungs every 6 (six) hours as needed for Wheezing. 18 g 0    fluticasone-salmeterol (ADVAIR DISKUS) 250-50 MCG/ACT Inhalation Aerosol Powder, Breath Activated Inhale 1 puff into the lungs every 12 (twelve) hours. (Patient not taking: Reported on 10/7/2024) 14 each 0      Past Medical History:    Asthma (HCC)    Colitis    Ulcerative colitis (HCC)    repeat CLN in 2025      Past Surgical History:   Procedure Laterality Date    Colonoscopy  09/01/2016    repeat in 3 years     Colonoscopy N/A 10/17/2023    Procedure: COLONOSCOPY;  Surgeon: KACIE Santacruz MD;  Location: [facility] ENDOSCOPY     Elbow surgery        Family History   Problem Relation Age of Onset    Hypertension Father     Cancer Mother     Pulmonary Disease Brother       Social History:  Social History     Socioeconomic History    Marital status:    Tobacco Use    Smoking status: Never    Smokeless tobacco: Never   Vaping Use    Vaping status: Never Used   Substance and Sexual Activity    Alcohol use: Yes     Comment: a beer every now and then     Drug use: Never     Social Determinants of Health      Received from [facility], [facility]    Social Connections    Received from [facility], [facility]    Housing Stability           REVIEW OF SYSTEMS:   GENERAL: feels well otherwise  SKIN: denies any unusual skin lesions or rashes  EYES:denies vision change.   HEENT: no ear and throat pain. no hearing changes  LUNGS: denies cough nor Shortness of breath with exertion.  Asthma has been stable, but requests inhalers to have on hand for future.  Uses inhalers with URIs  CARDIOVASCULAR: denies chest pain on exertion or palpitations.  GI: denies abdominal pain, constipation or diarrhea  : denies nocturia or changes in stream  NEURO: denies headaches or lightheadedness  PSYCHE: denies depression or anxiety    EXAM:   /80   Pulse 82   Temp 97.3 °F (36.3 °C)   Ht 5' 8\" (1.727 m)   Wt 245 lb 3.2 oz (111.2 kg)   SpO2 97%   BMI 37.28 kg/m²   Body mass index is 37.28 kg/m².   GENERAL: well developed, well nourished,in no apparent distress  SKIN: no rashes,no suspicious lesions  HEENT: atraumatic, normocephalic, O/P normal  EYES: nl conjunctiva. PERRL,  EOMI  NECK: supple,no adenopathy   LUNGS: clear to auscultation  CARDIO: RRR without murmur.  Carotids without bruits  GI: good BS's,no masses, HSM or tenderness  EXTREMITIES: no cyanosis or edema  NEURO: Oriented times three, II-XII cranial nerves are intact,motor and sensory are grossly intact. Steady  gait    ASSESSMENT AND PLAN:   Ruben Peck is a 39 year old male who presents for a complete physical exam.  Encounter Diagnosis   Name Primary?    Vasectomy evaluation Yes     Health maintenance:  check fasting Lipids, CMP, CBC    Encouraged regular exercise and low fat diet.   The patient indicates understanding of these issues and agrees to the plan.  Reviewed use of inhalers and indications for quick treatment and maintenance  The patient is asked to return for CPX in 1 yr  Orders Placed This Encounter   Procedures    Fluzone trivalent vaccine, PF 0.5mL, 6mo+ (39324)       Meds & Refills for this Visit:  Requested Prescriptions     Signed Prescriptions Disp Refills    fluticasone-salmeterol 115-21 MCG/ACT Inhalation Aerosol 1 each 2     Sig: One inhalation every 12 hours    albuterol 108 (90 Base) MCG/ACT Inhalation Aero Soln 6.7 g 0     Sig: Inhale 2 puffs into the lungs every 6 (six) hours as needed.    sertraline 100 MG Oral Tab 90 tablet 3     Sig: Take 1 tablet (100 mg total) by mouth daily.       HERMINIA Cardenas    Instructions:  Set up your wellness/annual eye exam and get dental cleanings every 6 months.  Your labs have come back and show that you prediabetic.  This means if you are diet and lifestyle do not change, you will become a diabetic within the next year or so.  Now is a good time to make changes, to reverse that trend.  I strongly encourage not eating within 3 hours of bedtime and try to decrease snacks such as chips & crackers, and less rice and potatoes.  Take note of what a serving size is, as you begin to read more labels.  Related to this is your lipid panel which shows elevated triglycerides.  This is the calories being stored as fat or in your liver, instead of used in the body.  The body uses this along with cholesterol to lay plaque down in your arteries.  The LDL or \"bad\" cholesterol is a low density lipid or a fluffy cholesterol.  This is more sticky or adherent to the arteries.     We first look at trying to eat leaner meats and lower fats such as fried foods or fatty meats and processed, premade foods.  Your vitamin D level is quite low and has been for more than 6 years.  I will send in a prescription for Vitamin D to the [facility] in [place]. Take this for 8 weeks and then transition to a lower dose one, over-the-counter/OTC Vitamin D 2000iu/50mcg, taken daily with meal. A low vitamin D will lower your immunity and plays into strong bones and muscles.  You thyroid, kidney and liver function are normal.     Follow-up with Dr. Jacome in 3 to 4 months for a recheck.  Make appointment as soon as possible as she books out several months.

## 2025-01-09 NOTE — PROGRESS NOTES
Ayesha Li MD  Department of Urology  [address] [place] [address]    T: [phone]  F: [phone]    To: Arleen Jacome MD   [address] [facility] [address]    Re: Ruben Peck   MRN: [id number]  : 6/3/1985    Dear Arleen Jacome MD,    Today I had the pleasure of seeing Ruben Peck in my clinic. As you know, Mr. Peck is a pleasant 39 year old year old male who I am seeing for vasectomy consult. Patient was last seen in this department on Visit date not found.    Briefly, patient is  with children and desires vasectomy as a means of contraception.        PAST MEDICAL HISTORY:  Past Medical History:    Asthma (HCC)    Colitis    Ulcerative colitis (HCC)    repeat CLN in         PAST SURGICAL HISTORY:  Past Surgical History:   Procedure Laterality Date    Colonoscopy  2016    repeat in 3 years     Colonoscopy N/A 10/17/2023    Procedure: COLONOSCOPY;  Surgeon: KACIE Santacruz MD;  Location: [facility] ENDOSCOPY    Elbow surgery           ALLERGIES:  Allergies[1]      MEDICATIONS:  Current Outpatient Medications   Medication Instructions    albuterol 108 (90 Base) MCG/ACT Inhalation Aero Soln 2 puffs, Inhalation, Every 6 hours PRN    albuterol 108 (90 Base) MCG/ACT Inhalation Aero Soln 2 puffs, Inhalation, Every 6 hours PRN    fluticasone-salmeterol (ADVAIR DISKUS) 250-50 MCG/ACT Inhalation Aerosol Powder, Breath Activated 1 puff, Inhalation, Every 12 hours scheduled    fluticasone-salmeterol 115-21 MCG/ACT Inhalation Aerosol One inhalation every 12 hours    sertraline (ZOLOFT) 100 mg, Oral, Daily        FAMILY HISTORY:  Family History   Problem Relation Age of Onset    Hypertension Father     Cancer Mother     Pulmonary Disease Brother         SOCIAL HISTORY:  Social History     Socioeconomic History    Marital status:    Tobacco Use    Smoking status: Never    Smokeless tobacco: Never   Vaping Use    Vaping status: Never Used   Substance and  Sexual Activity    Alcohol use: Yes     Comment: a beer every now and then     Drug use: Never     Social Drivers of Health      Received from [facility], [facility]    Social Connections    Received from [facility], [facility]    Housing Stability          PHYSICAL EXAMINATION:  There were no vitals filed for this visit.  CONSTITUTIONAL: No apparent distress, cooperative and communicative  NEUROLOGIC: Alert and oriented   HEAD: Normocephalic, atraumatic   EYES: Sclera non-icteric   ENT: Hearing intact, moist mucous membranes   NECK: No obvious goiter or masses   RESPIRATORY: Normal respiratory effort, Nonlabored breathing on room air  SKIN: No evident rashes   ABDOMEN: Soft, nontender, nondistended, no rebound tenderness, no guarding, no masses  GENITOURINARY: bilateral vasa palpated; tight scrotum  DIGITAL RECTAL EXAM: did not perform    REVIEW OF SYSTEMS:    A comprehensive 10-point review of systems was completed.  Pertinent positives and negatives are noted in the the HPI.       LABORATORY DATA:  None relevant        IMAGING REVIEW:  None relevant     OTHER RELEVANT DATA:   none     IMPRESSION: vasectomy, proceed    We discussed that a vasectomy is intended to be a permanent form of contraception and that if he desires fertility after vasectomy, options included vasectomy reversal and sperm retrieval with possible in vitro fertilization. These options are not always successful and may be very expensive.    We also discussed the risks of vasectomy which included symptomatic hematoma and infection (1-2%), chronic scrotal pain (6%; caused by congestive epididymitis, sperm granuloma and infective epididymoorchitis) need for repeat vasectomy (<1% of cases), need for additional procedures, vasectomy failure, and pregnancy (1 in 2000 men). The chronic scrotal discomfort may be no more than a low-grade chronic ache that causes little  disability and requires only symptomatic treatment. However, a proportion of patients will be sufficiently debilitated to seek epididymectomy or even orchidectomy (removal of the epididymis and or testicle). Furthermore, for a very small number of patients, even this radical surgery will not provide relief from their scrotal discomfort.     We discussed that he will have two small incisions in his scrotum with dissolvable sutures. He was told that vasectomy does NOT produce immediate sterility and that following vasectomy, another form of contraception is required until vas occlusion is confirmed by post vasectomy semen analysis. Post vasectomy semenanalysis will be obtained 12-16 weeks after the vasectomy. He was told that he may stop using other methods of contraception when examination of one well-mixed, uncentrifuged, fresh post-vasectomy semen specimen shows azoospermia or only rare non-motile sperm. He was once again told that even after vas occlusion is confirmed, vasectomy is not 100% reliable in preventing pregnancy and that the risk of pregnancy after vasectomy is approximately 1 in 2,000 men who have post-vasectomy azoospermia or semenanalysis showing rare non-motile sperm. The reasons are very early recanalization of the vas deferens or the presence of an accessory vas unrecognized at the time of surgery. There have also been cases of DNA-confirmed paternity despite documented azoospermia before and after conception. He was told to refrain from ejaculation for approximately one week after vasectomy. If there are >100,000 non-motile sperm/mL after 6 months post vasectomy, we would trend the semenanalysis for consideration of repeat vasectomy.       PLAN:  vasectomy    Thank you for referring this very pleasant patient to my clinic. If you have any questions or concerns, please do not hesitate to contact me.    Sincerely,  Ayesha Li MD    30 minutes were spent on this patient at this visit  obtaining a history, reviewing medical records, developing a treatment plan, counseling and discussing treatment strategy with patient, coordination of care and documentation.     The 21st Century Cures Act makes medical notes available to patients in the interest of transparency.  However, please be advised that this is a medical document.  It is intended as a peer to peer communication.  It is written in medical language and may contain abbreviations or verbiage that are technical and unfamiliar.  It may appear blunt or direct.  Medical documents are intended to carry relevant information, facts as evident, and the clinical opinion of the practitioner.         [1] No Known Allergies

## 2025-01-10 ENCOUNTER — OFFICE VISIT (OUTPATIENT)
Dept: SURGERY | Facility: CLINIC | Age: 40
End: 2025-01-10
Payer: COMMERCIAL

## 2025-01-10 VITALS
HEART RATE: 74 BPM | DIASTOLIC BLOOD PRESSURE: 99 MMHG | HEIGHT: 68 IN | BODY MASS INDEX: 37.13 KG/M2 | SYSTOLIC BLOOD PRESSURE: 165 MMHG | WEIGHT: 245 LBS

## 2025-01-10 DIAGNOSIS — Z30.09 FAMILY PLANNING: Primary | ICD-10-CM

## 2025-01-10 PROCEDURE — 3008F BODY MASS INDEX DOCD: CPT | Performed by: UROLOGY

## 2025-01-10 PROCEDURE — 99203 OFFICE O/P NEW LOW 30 MIN: CPT | Performed by: UROLOGY

## 2025-01-10 PROCEDURE — 3077F SYST BP >= 140 MM HG: CPT | Performed by: UROLOGY

## 2025-01-10 PROCEDURE — 3080F DIAST BP >= 90 MM HG: CPT | Performed by: UROLOGY

## 2025-02-21 ENCOUNTER — PROCEDURE (OUTPATIENT)
Dept: SURGERY | Facility: CLINIC | Age: 40
End: 2025-02-21
Payer: COMMERCIAL

## 2025-02-21 VITALS — HEART RATE: 103 BPM | DIASTOLIC BLOOD PRESSURE: 81 MMHG | SYSTOLIC BLOOD PRESSURE: 147 MMHG

## 2025-02-21 DIAGNOSIS — Z30.09 FAMILY PLANNING: Primary | ICD-10-CM

## 2025-02-21 PROCEDURE — 3079F DIAST BP 80-89 MM HG: CPT | Performed by: UROLOGY

## 2025-02-21 PROCEDURE — 3077F SYST BP >= 140 MM HG: CPT | Performed by: UROLOGY

## 2025-02-21 PROCEDURE — 55250 REMOVAL OF SPERM DUCT(S): CPT | Performed by: UROLOGY

## 2025-02-21 NOTE — PROCEDURES
BILATERAL VASECTOMY     PRE-OP DIAGNOSIS: Family Planning/Desires Male Sterility     POST-OP DIAGNOSIS: Same     PROCEDURE:  1. Bilateral vasectomy     SURGEON: Ayesha Li MD     ASSISTANT: none     EBL: minimal     ANESTHESIA: Local, Lidocaine 1%, 10cc     SPECIMENS:  1. RIGHT segment of vas  2. LEFT segment of vas     INDICATIONS: 39 year-old   man with  children who desires sterility with bilateral vasectomy.      We discussed that a vasectomy is intended to be a permanent form of contraception and that if he desires fertility after vasectomy, options included vasectomy reversal and sperm retrieval with possible in vitro fertilization. These options are not always successful and may be very expensive.     We also discussed the risks of vasectomy which included symptomatic hematoma and infection (1-2%), chronic scrotal pain (6%; caused by congestive epididymitis, sperm granuloma and infective epididymoorchitis) need for repeat vasectomy (<1% of cases), need for additional procedures, vasectomy failure, and pregnancy (1 in 2000 men). The chronic scrotal discomfort may be no more than a low-grade chronic ache that causes little disability and requires only symptomatic treatment. However, a proportion of patients will be sufficiently debilitated to seek epididymectomy or even orchidectomy (removal of the epididymis and or testicle). Furthermore, for a very small number of patients, even this radical surgery will not provide relief from their scrotal discomfort.       We discussed that he will have two small incisions in his scrotum with dissolvable sutures. He was told that vasectomy does NOT produce immediate sterility and that following vasectomy, another form of contraception is required until vas occlusion is confirmed by post vasectomy semen analysis. Post vasectomy semenanalysis will be obtained 12-16 weeks after the vasectomy. He was told that he may stop using other methods of contraception when  examination of one well-mixed, uncentrifuged, fresh post-vasectomy semen specimen shows azoospermia or only rare non-motile sperm. He was once again told that even after vas occlusion is confirmed, vasectomy is not 100% reliable in preventing pregnancy and that the risk of pregnancy after vasectomy is approximately 1 in 2,000 men who have post-vasectomy azoospermia or semenanalysis showing rare non-motile sperm. The reasons are very early recanalization of the vas deferens or the presence of an accessory vas unrecognized at the time of surgery. There have also been cases of DNA-confirmed paternity despite documented azoospermia before and after conception. He was told to refrain from ejaculation for approximately one week after vasectomy. If there are >100,000 non-motile sperm/mL after 6 months post vasectomy, we would trend the semenanalysis for consideration of repeat vasectomy.         PROCEDURE: After the appropriate time out checklist was performed, and it was confirmed that the patient was in fact the patient here for vasectomy, the scrotum was prepped and draped in an appropriate fashion. The right vas was then grasped and brought up underneath the skin surface. The skin was injected with lidocaine to make a small wheel. We then anesthestized the skin, dartose muscle and the vasal sheath with 5cc of lidocaine 1%. After an appropriate time for that to set, we used a 15 blade scalpel to make a small incision overlying the vas in the right hemiscrotum. A snap was used to spread the tissue overlying the vas to make room for the vas ring forceps. The ring forceps was used to grasp the vas deferens to hold it in place. We used the 15 blade to cut away at the vasal sheath and used towel clamps to just grasp the vas deferens. Once we were able to isolate just the vas deferens, we used a small mosquito clamp to push down the vasal sheath. Two snaps were applied to the vas - one at the testicular end and another at the  abdominal end. The vas was transected over the snaps and a 1-2cm portion of vas was removed. The needle tip electrocautery was inserted first monika the abdominal end and then into the testicular end for about 1.5cm. By slowly pulling back and firing it, you could see that the mucosa was being cauterized. This was not a full-thickness cauterization, but only a mucosal cauterization. Clips were placed on the testicular and abdominal ends of the vas stumps. There was no bleeding seen from the vasal stumps, and they were dropped back into the left hemiscrotum. The dartos muscle and skin were cauterized. An exactly similar procedure was performed on the LEFT side. Again, mucosal cauterization was undertaken. No bleeding was seen. Again, closure was the same. The skin was approximated with 3-0 chromic sutures in a horizontal mattress fashion. Fluffs gauze and scrotal support were placed.     DISPOSITION: home     FOLLOW-UP: Post vasectomy semen analysis 8-16 weeks after vasectomy. Patient knows to use another form of contraception until vasal occlusion is confirmed by post-vasectomy semen analysis.

## 2025-05-24 ENCOUNTER — LAB ENCOUNTER (OUTPATIENT)
Dept: LAB | Facility: HOSPITAL | Age: 40
End: 2025-05-24
Attending: UROLOGY
Payer: COMMERCIAL

## 2025-05-24 DIAGNOSIS — Z30.09 FAMILY PLANNING: ICD-10-CM

## 2025-05-24 PROCEDURE — 89321 SEMEN ANAL SPERM DETECTION: CPT

## 2025-06-08 DIAGNOSIS — M26.609 TMJ (TEMPOROMANDIBULAR JOINT DISORDER): ICD-10-CM

## 2025-06-08 DIAGNOSIS — Z00.00 ANNUAL PHYSICAL EXAM: ICD-10-CM

## 2025-06-08 DIAGNOSIS — E55.9 VITAMIN D DEFICIENCY: ICD-10-CM

## 2025-06-08 DIAGNOSIS — F41.9 ANXIETY AND DEPRESSION: ICD-10-CM

## 2025-06-08 DIAGNOSIS — F32.A ANXIETY AND DEPRESSION: ICD-10-CM

## 2025-06-08 DIAGNOSIS — J45.40 MODERATE PERSISTENT ASTHMA WITHOUT COMPLICATION (HCC): ICD-10-CM

## 2025-06-10 RX ORDER — ALBUTEROL SULFATE 90 UG/1
2 INHALANT RESPIRATORY (INHALATION) EVERY 6 HOURS PRN
Qty: 18 G | Refills: 0 | Status: SHIPPED | OUTPATIENT
Start: 2025-06-10

## 2025-06-10 RX ORDER — FLUTICASONE PROPIONATE AND SALMETEROL 250; 50 UG/1; UG/1
1 POWDER RESPIRATORY (INHALATION) EVERY 12 HOURS SCHEDULED
Qty: 14 EACH | Refills: 0 | Status: SHIPPED | OUTPATIENT
Start: 2025-06-10

## 2025-06-30 ENCOUNTER — TELEPHONE (OUTPATIENT)
Facility: CLINIC | Age: 40
End: 2025-06-30

## 2025-06-30 DIAGNOSIS — K51.919 ULCERATIVE COLITIS WITH COMPLICATION, UNSPECIFIED LOCATION (HCC): Primary | ICD-10-CM

## 2025-06-30 RX ORDER — MESALAMINE 1.2 G/1
2.4 TABLET, DELAYED RELEASE ORAL DAILY
Qty: 180 TABLET | Refills: 0 | Status: SHIPPED | OUTPATIENT
Start: 2025-06-30 | End: 2025-12-27

## 2025-06-30 NOTE — TELEPHONE ENCOUNTER
Per last operative note patient due for Colonoscopy 10/17/2025 - Please advise on orders.     Last Office Visit: 6/22/2023     Future appointment 11/14/2025    Last Refill: 4/4/2024    Procedure:  Colonoscopy 10/17/2023     Requested Prescriptions     Pending Prescriptions Disp Refills    MESALAMINE 1.2 g Oral Tab EC [Pharmacy Med Name: Mesalamine Dr 1.2 Gm Tab Nort] 180 tablet 0     Sig: Take 2 tablets (2.4 g total) by mouth daily. Take every day with a meal

## 2025-06-30 NOTE — TELEPHONE ENCOUNTER
Mesalamine refilled. Needs new blood work, has appt to see me in Nov 2025.    GI RN Staff:   Please have patient obtain blood work.

## 2025-07-05 DIAGNOSIS — E55.9 VITAMIN D DEFICIENCY: ICD-10-CM

## 2025-07-05 DIAGNOSIS — Z00.00 ANNUAL PHYSICAL EXAM: ICD-10-CM

## 2025-07-05 DIAGNOSIS — F32.A ANXIETY AND DEPRESSION: ICD-10-CM

## 2025-07-05 DIAGNOSIS — F41.9 ANXIETY AND DEPRESSION: ICD-10-CM

## 2025-07-05 DIAGNOSIS — J45.40 MODERATE PERSISTENT ASTHMA WITHOUT COMPLICATION (HCC): ICD-10-CM

## 2025-07-05 DIAGNOSIS — M26.609 TMJ (TEMPOROMANDIBULAR JOINT DISORDER): ICD-10-CM

## 2025-07-08 RX ORDER — FLUTICASONE PROPIONATE AND SALMETEROL 250; 50 UG/1; UG/1
1 POWDER RESPIRATORY (INHALATION) EVERY 12 HOURS SCHEDULED
Qty: 14 EACH | Refills: 0 | Status: SHIPPED | OUTPATIENT
Start: 2025-07-08

## 2025-08-04 ENCOUNTER — TELEPHONE (OUTPATIENT)
Facility: CLINIC | Age: 40
End: 2025-08-04

## 2025-08-19 ENCOUNTER — TELEPHONE (OUTPATIENT)
Dept: INTERNAL MEDICINE CLINIC | Facility: CLINIC | Age: 40
End: 2025-08-19

## 2025-08-19 DIAGNOSIS — F41.9 ANXIETY AND DEPRESSION: ICD-10-CM

## 2025-08-19 DIAGNOSIS — M26.609 TMJ (TEMPOROMANDIBULAR JOINT DISORDER): ICD-10-CM

## 2025-08-19 DIAGNOSIS — F32.A ANXIETY AND DEPRESSION: ICD-10-CM

## 2025-08-19 DIAGNOSIS — E55.9 VITAMIN D DEFICIENCY: ICD-10-CM

## 2025-08-19 DIAGNOSIS — Z00.00 ANNUAL PHYSICAL EXAM: ICD-10-CM

## 2025-08-19 DIAGNOSIS — J45.40 MODERATE PERSISTENT ASTHMA WITHOUT COMPLICATION (HCC): ICD-10-CM

## 2025-08-25 RX ORDER — FLUTICASONE PROPIONATE AND SALMETEROL 250; 50 UG/1; UG/1
1 POWDER RESPIRATORY (INHALATION) EVERY 12 HOURS SCHEDULED
Qty: 14 EACH | Refills: 0 | Status: SHIPPED | OUTPATIENT
Start: 2025-08-25

## 2025-08-25 RX ORDER — ALBUTEROL SULFATE 90 UG/1
2 INHALANT RESPIRATORY (INHALATION) EVERY 6 HOURS PRN
Qty: 18 G | Refills: 0 | Status: SHIPPED | OUTPATIENT
Start: 2025-08-25

## (undated) DIAGNOSIS — R79.89 ABNORMAL CBC: Primary | ICD-10-CM

## (undated) DEVICE — SNARE ENDOSCOPIC 10MM ROUND

## (undated) DEVICE — Device: Brand: DUAL NARE NASAL CANNULAE FEMALE LUER CON 7FT O2 TUBE

## (undated) DEVICE — 60 ML SYRINGE REGULAR TIP: Brand: MONOJECT

## (undated) DEVICE — FORCEPS BX L240CM DIA2.4MM L NDL RAD JAW 4

## (undated) DEVICE — KIT CLEAN ENDOKIT 1.1OZ GOWNX2

## (undated) DEVICE — KIT ENDO ORCAPOD 160/180/190

## (undated) DEVICE — SNARE OPTMZ PLPCTM TRP

## (undated) DEVICE — MEDI-VAC NON-CONDUCTIVE SUCTION TUBING 6MM X 1.8M (6FT.) L: Brand: CARDINAL HEALTH

## (undated) NOTE — LETTER
ASTHMA ACTION PLAN for Kathryne Fabry     : 6/3/1985     Date: 23  Doctor:  Luis Artis MD  Phone for doctor or clinic: [facility], [place]ancio Castaneda, [address] [address] [phone]           ACT Goal: 20 or greater    Call your provider if you require your rescue/quick reliever medication more than 2-3 times in a 24 hour period. If you require your rescue inhaler/medication more than 2-3 times weekly, your asthma may not be under proper control and you should seek medical attention. *Quick Relievers are Xopenex and Albuterol*    You can use the colors of a traffic light to help learn about your asthma medicines. Therapy Range       1. Green - Go! % of Personal Best Peak Flow   Use controller medicine. Breathing is good  No cough or wheeze  Can work and play Medicine How much to take When to take it    Medications       Sympathomimetics Instructions     albuterol 108 (90 Base) MCG/ACT Inhalation Aero Soln    Inhale 2 puffs into the lungs every 6 (six) hours as needed for Wheezing. fluticasone-salmeterol (ADVAIR DISKUS) 250-50 MCG/ACT Inhalation Aerosol Powder, Breath Activated    Inhale 1 puff into the lungs every 12 (twelve) hours. fluticasone-salmeterol 100-50 MCG/DOSE Inhalation Aerosol Powder, Breath Activated    Inhale 1 puff into the lungs 2 (two) times daily. 2. Yellow - Caution. 50-79% Personal Best Peak Flow  Use reliever medicine to keep an asthma attack from getting bad. Cough  Quick Relievers  Wheezing  Tight Chest  Wake up at night Medicine How much to take When to take it    If symptoms are not improving in 24-48 hrs, call office for further instructions  Medications       Sympathomimetics Instructions     albuterol 108 (90 Base) MCG/ACT Inhalation Aero Soln    Inhale 2 puffs into the lungs every 6 (six) hours as needed for Wheezing.      fluticasone-salmeterol (ADVAIR DISKUS) 250-50 MCG/ACT Inhalation Aerosol Powder, Breath Activated    Inhale 1 puff into the lungs every 12 (twelve) hours. fluticasone-salmeterol 100-50 MCG/DOSE Inhalation Aerosol Powder, Breath Activated    Inhale 1 puff into the lungs 2 (two) times daily. 3. Red - Stop! Danger! <50% Personal Best Peak Flow  Continue Controller Medications But ADD:   Medicine not helping  Breathing is hard and fast  Nose opens wide  Can't walk  Ribs show  Can't talk well Medicine How much to take When to take it    If your symptoms do not improve in ONE hour -  go to the emergency room or call 911 immediately! If symptoms improve, call office for appointment immediately. Albuterol inhaler 2 puffs every 20 minutes for three treatments       Don't forget:  Rinse mouth after using inhaler  Use spacer for inhaler  Remember to get your Flu vaccine every fall! [x] Asthma Action Plan reviewed with the caregiver and patient, and a copy of the plan was given to the patient/caregiver. [] Asthma Action Plan reviewed with the caregiver and patient on the phone, and copy mailed to patient/caregiver or sent via [employer]      Signatures:   Provider  Franko Zhu MD Patient  Tracee Main Caretaker

## (undated) NOTE — LETTER
[address]  Authorization for Surgical Operation and Procedure                                                                                           I hereby authorize Boaz Jackson MD, my physician and his/her assistants (if applicable), which may include medical students, residents, and/or fellows, to perform the following surgical operation/ procedure and administer such anesthesia as may be determined necessary by my physician: Operation/Procedure name (s) COLONOSCOPY on [address] [facility]   2. I recognize that during the surgical operation/procedure, unforeseen conditions may necessitate additional or different procedures than those listed above. I, therefore, further authorize and request that the above-named surgeon, assistants, or designees perform such procedures as are, in their judgment, necessary and desirable. 3.   My surgeon/physician has discussed prior to my surgery the potential benefits, risks and side effects of this procedure; the likelihood of achieving goals; and potential problems that might occur during recuperation. They also discussed reasonable alternatives to the procedure, including risks, benefits, and side effects related to the alternatives and risks related to not receiving this procedure. I have had all my questions answered and I acknowledge that no guarantee has been made as to the result that may be obtained. 4.   Should the need arise during my operation/procedure, which includes change of level of care prior to discharge, I also consent to the administration of blood and/or blood products. Further, I understand that despite careful testing and screening of blood or blood products by collecting agencies, I may still be subject to ill effects as a result of receiving a blood transfusion and/or blood products.   The following are some, but not all, of the potential risks that can occur: fever and allergic reactions, hemolytic reactions, transmission of diseases such as Hepatitis, AIDS and Cytomegalovirus (CMV) and fluid overload. In the event that I wish to have an autologous transfusion of my own blood, or a directed donor transfusion, I will discuss this with my physician. Check only if Refusing Blood or Blood Products  I understand refusal of blood or blood products as deemed necessary by my physician may have serious consequences to my condition to include possible death. I hereby assume responsibility for my refusal and release the hospital, its personnel, and my physicians from any responsibility for the consequences of my refusal.    o  Refuse   5. I authorize the use of any specimen, organs, tissues, body parts or foreign objects that may be removed from my body during the operation/procedure for diagnosis, research or teaching purposes and their subsequent disposal by hospital authorities. I also authorize the release of specimen test results and/or written reports to my treating physician on the hospital medical staff or other referring or consulting physicians involved in my care, at the discretion of the Pathologist or my treating physician. 6.   I consent to the photographing or videotaping of the operations or procedures to be performed, including appropriate portions of my body for medical, scientific, or educational purposes, provided my identity is not revealed by the pictures or by descriptive texts accompanying them. If the procedure has been photographed/videotaped, the surgeon will obtain the original picture, image, videotape or CD. The hospital will not be responsible for storage, release or maintenance of the picture, image, tape or CD.    7.   I consent to the presence of a  or observers in the operating room as deemed necessary by my physician or their designees.     8.   I recognize that in the event my procedure results in extended X-Ray/fluoroscopy time, I may develop a skin reaction. 9. If I have a Do Not Attempt Resuscitation (DNAR) order in place, that status will be suspended while in the operating room, procedural suite, and during the recovery period unless otherwise explicitly stated by me (or a person authorized to consent on my behalf). The surgeon or my attending physician will determine when the applicable recovery period ends for purposes of reinstating the DNAR order. 10. Patients having a sterilization procedure: I understand that if the procedure is successful the results will be permanent and it will therefore be impossible for me to inseminate, conceive, or bear children. I also understand that the procedure is intended to result in sterility, although the result has not been guaranteed. 11. I acknowledge that my physician has explained sedation/analgesia administration to me including the risk and benefits I consent to the administration of sedation/analgesia as may be necessary or desirable in the judgment of my physician. I CERTIFY THAT I HAVE READ AND FULLY UNDERSTAND THE ABOVE CONSENT TO OPERATION and/or OTHER PROCEDURE.     _________________________________________ _________________________________     ___________________________________  Signature of Patient     Signature of Responsible Person                   Printed Name of Responsible Person                              _________________________________________ ______________________________        ___________________________________  Signature of Witness         Date  Time         Relationship to Patient    STATEMENT OF PHYSICIAN My signature below affirms that prior to the time of the procedure; I have explained to the patient and/or his/her legal representative, the risks and benefits involved in the proposed treatment and any reasonable alternative to the proposed treatment.  I have also explained the risks and benefits involved in refusal of the proposed treatment and alternatives to the proposed treatment and have answered the patient's questions.  If I have a significant financial interest in a co-management agreement or a significant financial interest in any product or implant, or other significant relationship used in this procedure/surgery, I have disclosed this and had a discussion with my patient.     _______________________________________________________________ _____________________________  Alessandro Orf of Physician)                                                                                         (Date)                                   (Time)  Patient Name: Ondina Miranda    : 6/3/1985   Printed: 10/16/2023      Medical Record #: [id number]                                              Page 1 of 1

## (undated) NOTE — LETTER
ASTHMA ACTION PLAN for Rachel Melchor     : 6/3/1985     Date: 21  Doctor:  Star Robbins MD  Phone for doctor or clinic: [address] [facility], [facility].  [address] [facility] [address] [phone]           [facility] Controller Medications But ADD:   Medicine not helping  Breathing is hard and fast  Nose opens wide  Can't walk  Ribs show  Can't talk well Medicine How much to take When to take it    If your symptoms do not improve in ONE hour -  go to the emergency room

## (undated) NOTE — LETTER
To Whom It May Concern:    Ruben Peck has been under our care regarding ongoing medical issues. Because of this, he has been required to restrict his physical activities.    He may resume his usual activities, including work, on 02/24/2025 with the following restrictions:    []  None     [x]    No heavy lifting (over 10 pounds) for       1        week   []    Part-time (no more than             hours per week) for               week   []  Other:        Please feel free to contact us if there are any questions.      Sincerely,      Ayesha Li MD  [facility]  [facility] [address] [phone]

## (undated) NOTE — LETTER
Gabbie Garcia, [address] [facility]. [address] [facility] [address],  Triston Meneses       01/31/22        Patient: Torin Duarte   YOB: 1985   Date of Visit: 1/31/2022       Dear  Dr. Sagrario Glass MD,      Thank you for referring Torin Duarte to my practice.   Ple

## (undated) NOTE — LETTER
ASTHMA ACTION PLAN for Sudheer Sweet     : 6/3/1985     Date: 1/3/2018  Provider:  Roopa Beth MD  Phone for doctor or clinic: [address] [facility].  [place] [address] [address] [facility]